# Patient Record
Sex: FEMALE | Race: WHITE | NOT HISPANIC OR LATINO | Employment: OTHER | URBAN - METROPOLITAN AREA
[De-identification: names, ages, dates, MRNs, and addresses within clinical notes are randomized per-mention and may not be internally consistent; named-entity substitution may affect disease eponyms.]

---

## 2023-04-20 ENCOUNTER — APPOINTMENT (OUTPATIENT)
Dept: LAB | Facility: LAB | Age: 68
End: 2023-04-20
Payer: MEDICARE

## 2023-04-20 ENCOUNTER — OFFICE VISIT (OUTPATIENT)
Dept: PRIMARY CARE | Facility: CLINIC | Age: 68
End: 2023-04-20
Payer: MEDICARE

## 2023-04-20 VITALS
HEART RATE: 68 BPM | DIASTOLIC BLOOD PRESSURE: 61 MMHG | OXYGEN SATURATION: 97 % | TEMPERATURE: 97.4 F | WEIGHT: 176.8 LBS | SYSTOLIC BLOOD PRESSURE: 135 MMHG | BODY MASS INDEX: 25.31 KG/M2 | HEIGHT: 70 IN

## 2023-04-20 DIAGNOSIS — E78.2 MIXED HYPERLIPIDEMIA: ICD-10-CM

## 2023-04-20 DIAGNOSIS — M15.9 OSTEOARTHRITIS OF MULTIPLE JOINTS, UNSPECIFIED OSTEOARTHRITIS TYPE: ICD-10-CM

## 2023-04-20 DIAGNOSIS — I10 BENIGN ESSENTIAL HYPERTENSION: ICD-10-CM

## 2023-04-20 DIAGNOSIS — D12.4 ADENOMATOUS POLYP OF DESCENDING COLON: ICD-10-CM

## 2023-04-20 DIAGNOSIS — R76.8 ANA POSITIVE: ICD-10-CM

## 2023-04-20 DIAGNOSIS — R31.1 BENIGN ESSENTIAL MICROSCOPIC HEMATURIA: Primary | ICD-10-CM

## 2023-04-20 DIAGNOSIS — K21.9 GASTROESOPHAGEAL REFLUX DISEASE WITHOUT ESOPHAGITIS: ICD-10-CM

## 2023-04-20 DIAGNOSIS — N30.01 ACUTE CYSTITIS WITH HEMATURIA: ICD-10-CM

## 2023-04-20 PROBLEM — D12.6 ADENOMATOUS POLYP OF COLON: Status: ACTIVE | Noted: 2023-04-20

## 2023-04-20 PROBLEM — E78.5 HYPERLIPIDEMIA: Status: ACTIVE | Noted: 2023-04-20

## 2023-04-20 PROBLEM — F41.1 GAD (GENERALIZED ANXIETY DISORDER): Status: ACTIVE | Noted: 2023-04-20

## 2023-04-20 PROBLEM — M19.90 OSTEOARTHRITIS: Status: ACTIVE | Noted: 2023-04-20

## 2023-04-20 PROBLEM — F41.1 GAD (GENERALIZED ANXIETY DISORDER): Status: RESOLVED | Noted: 2023-04-20 | Resolved: 2023-04-20

## 2023-04-20 LAB
BASOPHILS (10*3/UL) IN BLOOD BY AUTOMATED COUNT: 0.06 X10E9/L (ref 0–0.1)
BASOPHILS/100 LEUKOCYTES IN BLOOD BY AUTOMATED COUNT: 0.6 % (ref 0–2)
EOSINOPHILS (10*3/UL) IN BLOOD BY AUTOMATED COUNT: 0.16 X10E9/L (ref 0–0.7)
EOSINOPHILS/100 LEUKOCYTES IN BLOOD BY AUTOMATED COUNT: 1.5 % (ref 0–6)
ERYTHROCYTE DISTRIBUTION WIDTH (RATIO) BY AUTOMATED COUNT: 13.9 % (ref 11.5–14.5)
ERYTHROCYTE MEAN CORPUSCULAR HEMOGLOBIN CONCENTRATION (G/DL) BY AUTOMATED: 30.2 G/DL (ref 32–36)
ERYTHROCYTE MEAN CORPUSCULAR VOLUME (FL) BY AUTOMATED COUNT: 93 FL (ref 80–100)
ERYTHROCYTES (10*6/UL) IN BLOOD BY AUTOMATED COUNT: 4.75 X10E12/L (ref 4–5.2)
HEMATOCRIT (%) IN BLOOD BY AUTOMATED COUNT: 44.3 % (ref 36–46)
HEMOGLOBIN (G/DL) IN BLOOD: 13.4 G/DL (ref 12–16)
IMMATURE GRANULOCYTES/100 LEUKOCYTES IN BLOOD BY AUTOMATED COUNT: 0.3 % (ref 0–0.9)
LEUKOCYTES (10*3/UL) IN BLOOD BY AUTOMATED COUNT: 10.5 X10E9/L (ref 4.4–11.3)
LYMPHOCYTES (10*3/UL) IN BLOOD BY AUTOMATED COUNT: 2.33 X10E9/L (ref 1.2–4.8)
LYMPHOCYTES/100 LEUKOCYTES IN BLOOD BY AUTOMATED COUNT: 22.3 % (ref 13–44)
MONOCYTES (10*3/UL) IN BLOOD BY AUTOMATED COUNT: 0.85 X10E9/L (ref 0.1–1)
MONOCYTES/100 LEUKOCYTES IN BLOOD BY AUTOMATED COUNT: 8.1 % (ref 2–10)
NEUTROPHILS (10*3/UL) IN BLOOD BY AUTOMATED COUNT: 7.04 X10E9/L (ref 1.2–7.7)
NEUTROPHILS/100 LEUKOCYTES IN BLOOD BY AUTOMATED COUNT: 67.2 % (ref 40–80)
NRBC (PER 100 WBCS) BY AUTOMATED COUNT: 0 /100 WBC (ref 0–0)
PLATELETS (10*3/UL) IN BLOOD AUTOMATED COUNT: 289 X10E9/L (ref 150–450)

## 2023-04-20 PROCEDURE — 1157F ADVNC CARE PLAN IN RCRD: CPT | Performed by: INTERNAL MEDICINE

## 2023-04-20 PROCEDURE — 87077 CULTURE AEROBIC IDENTIFY: CPT

## 2023-04-20 PROCEDURE — 1159F MED LIST DOCD IN RCRD: CPT | Performed by: INTERNAL MEDICINE

## 2023-04-20 PROCEDURE — 99214 OFFICE O/P EST MOD 30 MIN: CPT | Performed by: INTERNAL MEDICINE

## 2023-04-20 PROCEDURE — 81001 URINALYSIS AUTO W/SCOPE: CPT

## 2023-04-20 PROCEDURE — 87186 SC STD MICRODIL/AGAR DIL: CPT

## 2023-04-20 PROCEDURE — 1036F TOBACCO NON-USER: CPT | Performed by: INTERNAL MEDICINE

## 2023-04-20 PROCEDURE — 85025 COMPLETE CBC W/AUTO DIFF WBC: CPT

## 2023-04-20 PROCEDURE — 1160F RVW MEDS BY RX/DR IN RCRD: CPT | Performed by: INTERNAL MEDICINE

## 2023-04-20 PROCEDURE — 3078F DIAST BP <80 MM HG: CPT | Performed by: INTERNAL MEDICINE

## 2023-04-20 PROCEDURE — 87086 URINE CULTURE/COLONY COUNT: CPT

## 2023-04-20 PROCEDURE — 36415 COLL VENOUS BLD VENIPUNCTURE: CPT

## 2023-04-20 PROCEDURE — 3075F SYST BP GE 130 - 139MM HG: CPT | Performed by: INTERNAL MEDICINE

## 2023-04-20 PROCEDURE — 80053 COMPREHEN METABOLIC PANEL: CPT

## 2023-04-20 RX ORDER — CIPROFLOXACIN 500 MG/1
500 TABLET ORAL 2 TIMES DAILY
Qty: 20 TABLET | Refills: 0 | Status: SHIPPED | OUTPATIENT
Start: 2023-04-20 | End: 2023-04-30

## 2023-04-20 RX ORDER — HYDROXYCHLOROQUINE SULFATE 200 MG/1
2 TABLET, FILM COATED ORAL DAILY
COMMUNITY
Start: 2023-01-11

## 2023-04-20 RX ORDER — PREDNISONE 5 MG/1
1 TABLET ORAL DAILY
COMMUNITY
Start: 2023-04-05

## 2023-04-20 RX ORDER — TAMSULOSIN HYDROCHLORIDE 0.4 MG/1
0.4 CAPSULE ORAL DAILY
Qty: 30 CAPSULE | Refills: 11 | Status: SHIPPED | OUTPATIENT
Start: 2023-04-20 | End: 2024-03-12 | Stop reason: ALTCHOICE

## 2023-04-20 NOTE — ASSESSMENT & PLAN NOTE
CBC BMP urinalysis ultrasound of kidney and bladder given Cipro probiotics Flomax urology evaluation if problems continue Dr. Gunter

## 2023-04-20 NOTE — PROGRESS NOTES
Patient ID: Jessika Gama is a 67 y.o. female who presents for UTI (Hard time going to the bathroom, frequency, blood ).    Assessment/Plan     Problem List Items Addressed This Visit          Circulatory    RESOLVED: Benign essential hypertension       Digestive    Adenomatous polyp of colon    GERD (gastroesophageal reflux disease)     Tums Mylanta Prilosec GI evaluation for EGD colonoscopy            Genitourinary    Benign essential microscopic hematuria - Primary    Relevant Medications    tamsulosin (Flomax) 0.4 mg 24 hr capsule    Other Relevant Orders    Comprehensive Metabolic Panel    Acute cystitis with hematuria     CBC BMP urinalysis ultrasound of kidney and bladder given Cipro probiotics Flomax urology evaluation if problems continue Dr. Gunter         Relevant Medications    ciprofloxacin (Cipro) 500 mg tablet    tamsulosin (Flomax) 0.4 mg 24 hr capsule    Other Relevant Orders    CBC and Auto Differential    Urinalysis Microscopic Only    Urine Culture       Musculoskeletal    Osteoarthritis       Hematologic    FLAVIA positive     Rheumatology ophthalmology evaluation continue Plaquenil folic acid prednisone            Other    Hyperlipidemia     Endocrine work-up check thyroid lipid sugar follow-up            Source of history: Nurse, Medical personnel, Medical record, Patient.  History limitation: None.      HPI  Complaining of joint pain stomach pain pelvic pain urgency frequency hematuria onset acutely duration 2 weeks progressed acutely aggravating factor autoimmune disease underlying cystitis or nephritis cannot be rule out advised ultrasound of the gallbladder liver pancreas and kidney refer patient to urologist and rheumatologist    We will try the Cipro hydration and follow-up  No Known Allergies    Medications    Current Outpatient Medications   Medication Sig Dispense Refill    hydroxychloroquine (Plaquenil) 200 mg tablet Take 2 tablets (400 mg) by mouth once daily.      predniSONE  "(Deltasone) 5 mg tablet Take 1 tablet (5 mg) by mouth in the morning and 1 tablet (5 mg) before bedtime.      ciprofloxacin (Cipro) 500 mg tablet Take 1 tablet (500 mg) by mouth in the morning and 1 tablet (500 mg) before bedtime. Do all this for 10 days. 20 tablet 0    tamsulosin (Flomax) 0.4 mg 24 hr capsule Take 1 capsule (0.4 mg) by mouth once daily. 30 capsule 11     No current facility-administered medications for this visit.       Objective   Visit Vitals  /61 (BP Location: Right arm, Patient Position: Sitting, BP Cuff Size: Large adult)   Pulse 68   Temp 36.3 °C (97.4 °F)   Ht 1.778 m (5' 10\")   Wt 80.2 kg (176 lb 12.8 oz)   SpO2 97%   BMI 25.37 kg/m²   Smoking Status Never   BSA 1.99 m²       PHYSICAL EXAM  General: NAD. NCAT. Aox3   HEENT: PERRLA. EOMI. MMM. Nares patent bl.  Cardiovascular: RRR. No MRG. S1/S2 wnl.   Respiratory: CTABL. No acute respiratory distress.   GI: Soft, NT abdomen. BS present x 4.   : Pelvic pain kidney pain tenderness  MSK: Multiple muscles and joint group tenderness extremities: No edema. Cap refill < 2 sec.   Skin: No rashes or bruises.   Neuro: Aox3. Cranial Nerves grossly intact. Motor/sensory wnl.   Psych: Mood wnl.          ROS  Constitutional: Denies fevers, chills, fatigue, weight loss/gain  HEENT: Denies HA, vision changes, hearing loss, sore throat  Cardiac: Denies CP, palpitations, edema  Respiratory: Denies SOB, cough, pleuritic chest pain, PND, orthopnea  GI: Denies N/V/D, abd pain, constipation, black/bloody stools  : Denies urinary changes, frequency, hematuria, urgency, retention, flank pain  MSK: Denies joint pain, joint swelling, back pain, neck pain, extremity pain  Neuro: Denies numbness, weakness, tingling    Immunization History   Administered Date(s) Administered    Pfizer Purple Cap SARS-CoV-2 07/30/2021, 08/20/2021       No visits with results within 4 Month(s) from this visit.   Latest known visit with results is:   Legacy Encounter on " 01/05/2021   Component Date Value Ref Range Status    CRP 01/05/2021 0.29  mg/dL Final    Path Review - Serum Protein Electr* 01/05/2021 L.[id number]   Final    Sedimentation Rate 01/05/2021 2  0 - 30 mm/h Final    Total Protein 01/05/2021 6.1 (L)  6.4 - 8.2 g/dL Final    Albumin 01/05/2021 3.8  3.4 - 5.0 g/dL Final    Alpha 1 01/05/2021 0.3  0.2 - 0.6 g/dL Final    Alpha 2 01/05/2021 0.7  0.4 - 1.1 g/dL Final    Beta 01/05/2021 0.6  0.5 - 1.2 g/dL Final    Gamma 01/05/2021 0.6  0.5 - 1.4 g/dL Final    SPE Interpretation 01/05/2021 NORMAL   Final       Radiology: Reviewed imaging in powerchart.  No results found.    Family History   Problem Relation Name Age of Onset    Other (smoker) Mother      Emphysema Mother      Colon cancer Father       Social History     Socioeconomic History    Marital status:      Spouse name: None    Number of children: None    Years of education: None    Highest education level: None   Occupational History    None   Tobacco Use    Smoking status: Never    Smokeless tobacco: Never   Vaping Use    Vaping status: None   Substance and Sexual Activity    Alcohol use: Never    Drug use: Never    Sexual activity: None   Other Topics Concern    None   Social History Narrative    None     Social Determinants of Health     Financial Resource Strain: Not on file   Food Insecurity: Not on file   Transportation Needs: Not on file   Physical Activity: Not on file   Stress: Not on file   Social Connections: Not on file   Intimate Partner Violence: Not on file   Housing Stability: Not on file     Past Medical History:   Diagnosis Date    Abnormal findings on diagnostic imaging of other specified body structures 06/21/2021    Abnormal CT scan, chest    Acute maxillary sinusitis, unspecified 05/28/2021    Acute maxillary sinusitis    Allergic rhinitis, unspecified 06/21/2021    Chronic allergic rhinitis    Cleft lip, bilateral 01/21/2018    Bilateral cleft lip, complete    Encounter for follow-up  examination after completed treatment for conditions other than malignant neoplasm 01/11/2017    Follow-up for plastic surgery    Encounter for immunization 10/07/2021    Encounter for immunization    Encounter for screening for malignant neoplasm of vagina     Vaginal Pap smear    Encounter for screening mammogram for malignant neoplasm of breast 07/06/2021    Other screening mammogram    Generalized anxiety disorder 10/08/2021    JOVANA (generalized anxiety disorder)    Headache, unspecified 06/08/2021    Severe frontal headaches    Insomnia, unspecified 09/12/2019    Insomnia, persistent    Migraine, unspecified, not intractable, without status migrainosus 06/21/2021    Migraine headache    Otalgia, right ear 04/15/2022    Right ear pain    Other bursitis of hip, left hip 01/21/2018    Bursitis of left hip    Other malaise 06/08/2021    Malaise and fatigue    Other skin changes 12/14/2016    Facial aging    Pain in right shoulder 06/08/2021    Right shoulder pain    Pain in unspecified hand 01/21/2018    Pain in hand and fingers    Personal history of diseases of the skin and subcutaneous tissue 01/04/2019    History of cellulitis    Personal history of other diseases of the musculoskeletal system and connective tissue 06/21/2021    History of fibromyalgia    Personal history of other diseases of the respiratory system 03/30/2017    History of acute sinusitis    Personal history of other diseases of the respiratory system 03/30/2017    History of pharyngitis    Personal history of other diseases of the respiratory system 03/22/2017    History of sinusitis    Personal history of other drug therapy 06/25/2021    History of postmenopausal hormone replacement therapy    Personal history of other endocrine, nutritional and metabolic disease 06/21/2021    History of vitamin D deficiency    Personal history of other mental and behavioral disorders 01/04/2019    History of anxiety disorder    Personal history of other  mental and behavioral disorders 01/11/2017    History of anxiety disorder    Personal history of other specified conditions 06/21/2021    History of fatigue    Personal history of other specified conditions 10/07/2021    History of edema    Personal history of pneumonia (recurrent) 06/21/2021    History of pneumonia    Recurrent oral aphthae 03/22/2017    Canker sore    Unspecified mycosis 06/25/2021    Fungal infection of lung    Unspecified nonsuppurative otitis media, unspecified ear 03/17/2015    Middle ear effusion    Unspecified osteoarthritis, unspecified site 10/08/2021    Osteoarthritis     Past Surgical History:   Procedure Laterality Date    CATARACT EXTRACTION  05/11/2016    Cataract Surgery    COLONOSCOPY  03/04/2014    Complete Colonoscopy    HIP SURGERY  03/04/2014    Hip Surgery Right    OOPHORECTOMY  05/11/2016    Oophorectomy    OTHER SURGICAL HISTORY  03/04/2014    Hip Surgery Left    OTHER SURGICAL HISTORY  05/11/2016    Palatoplasty For Cleft Palate    OTHER SURGICAL HISTORY  05/11/2016    Repair Of Brow Ptosis Left Upper Eyelid    RHINOPLASTY  05/11/2016    Rhinoplasty     * Cannot find OR log *    Charting was completed using voice recognition technology and may include unintended errors.

## 2023-04-21 ENCOUNTER — TELEPHONE (OUTPATIENT)
Dept: PRIMARY CARE | Facility: CLINIC | Age: 68
End: 2023-04-21
Payer: MEDICARE

## 2023-04-21 DIAGNOSIS — N39.0 URINARY TRACT INFECTION WITHOUT HEMATURIA, SITE UNSPECIFIED: ICD-10-CM

## 2023-04-21 LAB
ALANINE AMINOTRANSFERASE (SGPT) (U/L) IN SER/PLAS: 13 U/L (ref 7–45)
ALBUMIN (G/DL) IN SER/PLAS: 4.4 G/DL (ref 3.4–5)
ALKALINE PHOSPHATASE (U/L) IN SER/PLAS: 46 U/L (ref 33–136)
ANION GAP IN SER/PLAS: 14 MMOL/L (ref 10–20)
ASPARTATE AMINOTRANSFERASE (SGOT) (U/L) IN SER/PLAS: 15 U/L (ref 9–39)
BACTERIA, URINE: ABNORMAL /HPF
BILIRUBIN TOTAL (MG/DL) IN SER/PLAS: 0.5 MG/DL (ref 0–1.2)
CALCIUM (MG/DL) IN SER/PLAS: 9.8 MG/DL (ref 8.6–10.6)
CARBON DIOXIDE, TOTAL (MMOL/L) IN SER/PLAS: 29 MMOL/L (ref 21–32)
CHLORIDE (MMOL/L) IN SER/PLAS: 102 MMOL/L (ref 98–107)
CREATININE (MG/DL) IN SER/PLAS: 0.64 MG/DL (ref 0.5–1.05)
GFR FEMALE: >90 ML/MIN/1.73M2
GLUCOSE (MG/DL) IN SER/PLAS: 108 MG/DL (ref 74–99)
POTASSIUM (MMOL/L) IN SER/PLAS: 4 MMOL/L (ref 3.5–5.3)
PROTEIN TOTAL: 6.5 G/DL (ref 6.4–8.2)
RBC, URINE: <1 /HPF (ref 0–5)
SODIUM (MMOL/L) IN SER/PLAS: 141 MMOL/L (ref 136–145)
UREA NITROGEN (MG/DL) IN SER/PLAS: 12 MG/DL (ref 6–23)
WBC CLUMPS, URINE: ABNORMAL /HPF
WBC, URINE: 68 /HPF (ref 0–5)

## 2023-04-21 NOTE — TELEPHONE ENCOUNTER
PT IS AWARE TO FOLLOW A LOW CARB DIET, THAT SHE HAS ANEMIA AND A UTI.  PLEASE SEND THE KEFLEX TO [facility] AND PT IS AWARE TO TAKE PROBIOTICS AND TO COME IN TO REPEAT A UA IN 2 WEEKS

## 2023-04-21 NOTE — TELEPHONE ENCOUNTER
----- Message from Elaine Martinez MD sent at 4/21/2023  9:10 AM EDT -----  Hyperglycemia anemia UTI advised low-carb diet Keflex 500 twice a day for 1 week repeat UA CNS 2 weeks take probiotics

## 2023-04-23 LAB — URINE CULTURE: ABNORMAL

## 2023-04-24 ENCOUNTER — TELEPHONE (OUTPATIENT)
Dept: PRIMARY CARE | Facility: CLINIC | Age: 68
End: 2023-04-24
Payer: MEDICARE

## 2023-04-24 NOTE — TELEPHONE ENCOUNTER
----- Message from Elaine Martinez MD sent at 4/24/2023 10:09 AM EDT -----  UTI discontinue nitrofurantoin just take Keflex 500 twice a day for 1 week

## 2023-04-25 ENCOUNTER — TELEPHONE (OUTPATIENT)
Dept: PRIMARY CARE | Facility: CLINIC | Age: 68
End: 2023-04-25
Payer: MEDICARE

## 2023-04-25 NOTE — TELEPHONE ENCOUNTER
----- Message from Elaine Martinez MD sent at 4/24/2023 10:27 AM EDT -----  See OB/GYN Dr. Cheri Harris

## 2023-08-30 ENCOUNTER — OFFICE (OUTPATIENT)
Dept: URBAN - METROPOLITAN AREA CLINIC 27 | Facility: CLINIC | Age: 68
End: 2023-08-30
Payer: COMMERCIAL

## 2023-08-30 VITALS
HEIGHT: 70 IN | SYSTOLIC BLOOD PRESSURE: 158 MMHG | DIASTOLIC BLOOD PRESSURE: 84 MMHG | WEIGHT: 166 LBS | HEART RATE: 56 BPM | TEMPERATURE: 97.4 F

## 2023-08-30 DIAGNOSIS — R19.7 DIARRHEA, UNSPECIFIED: ICD-10-CM

## 2023-08-30 DIAGNOSIS — K21.9 GASTRO-ESOPHAGEAL REFLUX DISEASE WITHOUT ESOPHAGITIS: ICD-10-CM

## 2023-08-30 LAB — THYROTROPIN (MIU/L) IN SER/PLAS BY DETECTION LIMIT <= 0.05 MIU/L: 4.61 MIU/L (ref 0.44–3.98)

## 2023-08-30 PROCEDURE — 99204 OFFICE O/P NEW MOD 45 MIN: CPT | Performed by: INTERNAL MEDICINE

## 2023-08-31 ENCOUNTER — AMBULATORY SURGICAL CENTER (OUTPATIENT)
Dept: URBAN - METROPOLITAN AREA SURGERY 12 | Facility: SURGERY | Age: 68
End: 2023-08-31
Payer: COMMERCIAL

## 2023-08-31 ENCOUNTER — AMBULATORY SURGICAL CENTER (OUTPATIENT)
Dept: URBAN - METROPOLITAN AREA SURGERY 12 | Facility: SURGERY | Age: 68
End: 2023-08-31

## 2023-08-31 ENCOUNTER — OFFICE (OUTPATIENT)
Dept: URBAN - METROPOLITAN AREA PATHOLOGY 2 | Facility: PATHOLOGY | Age: 68
End: 2023-08-31
Payer: COMMERCIAL

## 2023-08-31 VITALS
DIASTOLIC BLOOD PRESSURE: 77 MMHG | OXYGEN SATURATION: 96 % | SYSTOLIC BLOOD PRESSURE: 114 MMHG | OXYGEN SATURATION: 100 % | SYSTOLIC BLOOD PRESSURE: 105 MMHG | RESPIRATION RATE: 11 BRPM | WEIGHT: 162 LBS | HEART RATE: 58 BPM | HEIGHT: 70 IN | RESPIRATION RATE: 13 BRPM | HEART RATE: 59 BPM | DIASTOLIC BLOOD PRESSURE: 74 MMHG | RESPIRATION RATE: 11 BRPM | RESPIRATION RATE: 12 BRPM | DIASTOLIC BLOOD PRESSURE: 61 MMHG | HEART RATE: 60 BPM | SYSTOLIC BLOOD PRESSURE: 98 MMHG | DIASTOLIC BLOOD PRESSURE: 64 MMHG | OXYGEN SATURATION: 100 % | SYSTOLIC BLOOD PRESSURE: 98 MMHG | OXYGEN SATURATION: 88 % | RESPIRATION RATE: 14 BRPM | HEART RATE: 61 BPM | DIASTOLIC BLOOD PRESSURE: 69 MMHG | DIASTOLIC BLOOD PRESSURE: 61 MMHG | SYSTOLIC BLOOD PRESSURE: 105 MMHG | DIASTOLIC BLOOD PRESSURE: 61 MMHG | OXYGEN SATURATION: 88 % | DIASTOLIC BLOOD PRESSURE: 69 MMHG | HEART RATE: 70 BPM | SYSTOLIC BLOOD PRESSURE: 140 MMHG | OXYGEN SATURATION: 100 % | RESPIRATION RATE: 17 BRPM | SYSTOLIC BLOOD PRESSURE: 114 MMHG | SYSTOLIC BLOOD PRESSURE: 111 MMHG | SYSTOLIC BLOOD PRESSURE: 140 MMHG | DIASTOLIC BLOOD PRESSURE: 74 MMHG | HEART RATE: 70 BPM | DIASTOLIC BLOOD PRESSURE: 64 MMHG | OXYGEN SATURATION: 98 % | WEIGHT: 162 LBS | DIASTOLIC BLOOD PRESSURE: 55 MMHG | DIASTOLIC BLOOD PRESSURE: 55 MMHG | TEMPERATURE: 98.3 F | WEIGHT: 162 LBS | SYSTOLIC BLOOD PRESSURE: 111 MMHG | DIASTOLIC BLOOD PRESSURE: 69 MMHG | SYSTOLIC BLOOD PRESSURE: 139 MMHG | SYSTOLIC BLOOD PRESSURE: 111 MMHG | RESPIRATION RATE: 17 BRPM | SYSTOLIC BLOOD PRESSURE: 105 MMHG | HEART RATE: 63 BPM | SYSTOLIC BLOOD PRESSURE: 128 MMHG | RESPIRATION RATE: 14 BRPM | RESPIRATION RATE: 18 BRPM | RESPIRATION RATE: 13 BRPM | HEIGHT: 70 IN | RESPIRATION RATE: 12 BRPM | DIASTOLIC BLOOD PRESSURE: 55 MMHG | HEART RATE: 62 BPM | TEMPERATURE: 98.3 F | HEART RATE: 63 BPM | SYSTOLIC BLOOD PRESSURE: 100 MMHG | OXYGEN SATURATION: 96 % | DIASTOLIC BLOOD PRESSURE: 78 MMHG | DIASTOLIC BLOOD PRESSURE: 64 MMHG | OXYGEN SATURATION: 98 % | SYSTOLIC BLOOD PRESSURE: 114 MMHG | RESPIRATION RATE: 18 BRPM | RESPIRATION RATE: 12 BRPM | RESPIRATION RATE: 11 BRPM | RESPIRATION RATE: 14 BRPM | DIASTOLIC BLOOD PRESSURE: 74 MMHG | OXYGEN SATURATION: 96 % | SYSTOLIC BLOOD PRESSURE: 140 MMHG | SYSTOLIC BLOOD PRESSURE: 139 MMHG | OXYGEN SATURATION: 88 % | TEMPERATURE: 98.3 F | HEART RATE: 63 BPM | HEART RATE: 58 BPM | HEART RATE: 59 BPM | DIASTOLIC BLOOD PRESSURE: 77 MMHG | HEART RATE: 62 BPM | DIASTOLIC BLOOD PRESSURE: 77 MMHG | SYSTOLIC BLOOD PRESSURE: 98 MMHG | RESPIRATION RATE: 13 BRPM | HEART RATE: 70 BPM | HEART RATE: 60 BPM | HEART RATE: 60 BPM | SYSTOLIC BLOOD PRESSURE: 100 MMHG | HEART RATE: 58 BPM | RESPIRATION RATE: 18 BRPM | DIASTOLIC BLOOD PRESSURE: 78 MMHG | SYSTOLIC BLOOD PRESSURE: 128 MMHG | DIASTOLIC BLOOD PRESSURE: 78 MMHG | HEART RATE: 62 BPM | HEART RATE: 61 BPM | OXYGEN SATURATION: 98 % | SYSTOLIC BLOOD PRESSURE: 139 MMHG | HEART RATE: 61 BPM | SYSTOLIC BLOOD PRESSURE: 128 MMHG | HEART RATE: 59 BPM | RESPIRATION RATE: 17 BRPM | SYSTOLIC BLOOD PRESSURE: 100 MMHG | HEIGHT: 70 IN

## 2023-08-31 DIAGNOSIS — K44.9 DIAPHRAGMATIC HERNIA WITHOUT OBSTRUCTION OR GANGRENE: ICD-10-CM

## 2023-08-31 DIAGNOSIS — K21.00 GASTRO-ESOPHAGEAL REFLUX DISEASE WITH ESOPHAGITIS, WITHOUT B: ICD-10-CM

## 2023-08-31 DIAGNOSIS — K21.9 GASTRO-ESOPHAGEAL REFLUX DISEASE WITHOUT ESOPHAGITIS: ICD-10-CM

## 2023-08-31 PROBLEM — K22.89 OTHER SPECIFIED DISEASE OF ESOPHAGUS: Status: ACTIVE | Noted: 2023-08-31

## 2023-08-31 PROCEDURE — 43450 DILATE ESOPHAGUS 1/MULT PASS: CPT | Performed by: INTERNAL MEDICINE

## 2023-08-31 PROCEDURE — 43239 EGD BIOPSY SINGLE/MULTIPLE: CPT | Performed by: INTERNAL MEDICINE

## 2023-08-31 PROCEDURE — 88342 IMHCHEM/IMCYTCHM 1ST ANTB: CPT | Performed by: PATHOLOGY

## 2023-08-31 PROCEDURE — 88313 SPECIAL STAINS GROUP 2: CPT | Performed by: PATHOLOGY

## 2023-08-31 PROCEDURE — 88305 TISSUE EXAM BY PATHOLOGIST: CPT | Performed by: PATHOLOGY

## 2023-09-07 ENCOUNTER — TELEPHONE (OUTPATIENT)
Dept: PRIMARY CARE | Facility: CLINIC | Age: 68
End: 2023-09-07
Payer: MEDICARE

## 2023-09-07 NOTE — TELEPHONE ENCOUNTER
Pt requesting referral for eye doctor due to heavy eyelids, fax number [phone] Dr. Mackey. Please advise.

## 2023-12-11 ENCOUNTER — LAB (OUTPATIENT)
Dept: LAB | Facility: LAB | Age: 68
End: 2023-12-11
Payer: MEDICARE

## 2023-12-11 ENCOUNTER — OFFICE (OUTPATIENT)
Dept: URBAN - METROPOLITAN AREA CLINIC 27 | Facility: CLINIC | Age: 68
End: 2023-12-11
Payer: COMMERCIAL

## 2023-12-11 VITALS — WEIGHT: 175 LBS | HEIGHT: 70 IN

## 2023-12-11 DIAGNOSIS — R11.0 NAUSEA: ICD-10-CM

## 2023-12-11 DIAGNOSIS — R19.7 DIARRHEA, UNSPECIFIED: ICD-10-CM

## 2023-12-11 DIAGNOSIS — R11.0 NAUSEA: Primary | ICD-10-CM

## 2023-12-11 PROCEDURE — 99214 OFFICE O/P EST MOD 30 MIN: CPT | Performed by: INTERNAL MEDICINE

## 2023-12-11 PROCEDURE — 82150 ASSAY OF AMYLASE: CPT

## 2023-12-11 PROCEDURE — 80053 COMPREHEN METABOLIC PANEL: CPT

## 2023-12-11 PROCEDURE — 85652 RBC SED RATE AUTOMATED: CPT

## 2023-12-11 PROCEDURE — 85025 COMPLETE CBC W/AUTO DIFF WBC: CPT

## 2023-12-11 PROCEDURE — 86364 TISS TRNSGLTMNASE EA IG CLAS: CPT

## 2023-12-11 PROCEDURE — 86258 DGP ANTIBODY EACH IG CLASS: CPT

## 2023-12-12 LAB
ALBUMIN SERPL BCP-MCNC: 4.6 G/DL (ref 3.4–5)
ALP SERPL-CCNC: 53 U/L (ref 33–136)
ALT SERPL W P-5'-P-CCNC: 17 U/L (ref 7–45)
AMYLASE SERPL-CCNC: 42 U/L (ref 29–103)
ANION GAP SERPL CALC-SCNC: 15 MMOL/L (ref 10–20)
AST SERPL W P-5'-P-CCNC: 15 U/L (ref 9–39)
BASOPHILS # BLD AUTO: 0.01 X10*3/UL (ref 0–0.1)
BASOPHILS NFR BLD AUTO: 0.1 %
BILIRUB SERPL-MCNC: 0.4 MG/DL (ref 0–1.2)
BUN SERPL-MCNC: 19 MG/DL (ref 6–23)
CALCIUM SERPL-MCNC: 9.1 MG/DL (ref 8.6–10.6)
CHLORIDE SERPL-SCNC: 101 MMOL/L (ref 98–107)
CO2 SERPL-SCNC: 26 MMOL/L (ref 21–32)
CREAT SERPL-MCNC: 0.65 MG/DL (ref 0.5–1.05)
EOSINOPHIL # BLD AUTO: 0 X10*3/UL (ref 0–0.7)
EOSINOPHIL NFR BLD AUTO: 0 %
ERYTHROCYTE [DISTWIDTH] IN BLOOD BY AUTOMATED COUNT: 12.7 % (ref 11.5–14.5)
ERYTHROCYTE [SEDIMENTATION RATE] IN BLOOD BY WESTERGREN METHOD: 2 MM/H (ref 0–30)
GFR SERPL CREATININE-BSD FRML MDRD: >90 ML/MIN/1.73M*2
GLIADIN PEPTIDE IGA SER IA-ACNC: <1 U/ML
GLIADIN PEPTIDE IGG SER IA-ACNC: <1 U/ML
GLUCOSE SERPL-MCNC: 135 MG/DL (ref 74–99)
HCT VFR BLD AUTO: 41.7 % (ref 36–46)
HGB BLD-MCNC: 13.1 G/DL (ref 12–16)
IMM GRANULOCYTES # BLD AUTO: 0.02 X10*3/UL (ref 0–0.7)
IMM GRANULOCYTES NFR BLD AUTO: 0.2 % (ref 0–0.9)
LYMPHOCYTES # BLD AUTO: 1.06 X10*3/UL (ref 1.2–4.8)
LYMPHOCYTES NFR BLD AUTO: 12.9 %
MCH RBC QN AUTO: 27.6 PG (ref 26–34)
MCHC RBC AUTO-ENTMCNC: 31.4 G/DL (ref 32–36)
MCV RBC AUTO: 88 FL (ref 80–100)
MONOCYTES # BLD AUTO: 0.74 X10*3/UL (ref 0.1–1)
MONOCYTES NFR BLD AUTO: 9 %
NEUTROPHILS # BLD AUTO: 6.37 X10*3/UL (ref 1.2–7.7)
NEUTROPHILS NFR BLD AUTO: 77.8 %
NRBC BLD-RTO: 0 /100 WBCS (ref 0–0)
PLATELET # BLD AUTO: 296 X10*3/UL (ref 150–450)
POTASSIUM SERPL-SCNC: 4.1 MMOL/L (ref 3.5–5.3)
PROT SERPL-MCNC: 6.8 G/DL (ref 6.4–8.2)
RBC # BLD AUTO: 4.74 X10*6/UL (ref 4–5.2)
SODIUM SERPL-SCNC: 138 MMOL/L (ref 136–145)
TTG IGA SER IA-ACNC: <1 U/ML
TTG IGG SER IA-ACNC: <1 U/ML
WBC # BLD AUTO: 8.2 X10*3/UL (ref 4.4–11.3)

## 2024-02-26 ENCOUNTER — OFFICE (OUTPATIENT)
Dept: URBAN - METROPOLITAN AREA CLINIC 26 | Facility: CLINIC | Age: 69
End: 2024-02-26

## 2024-02-26 VITALS
HEART RATE: 68 BPM | SYSTOLIC BLOOD PRESSURE: 136 MMHG | WEIGHT: 176 LBS | DIASTOLIC BLOOD PRESSURE: 78 MMHG | TEMPERATURE: 97.2 F | HEIGHT: 70 IN

## 2024-02-26 DIAGNOSIS — K52.832 LYMPHOCYTIC COLITIS: ICD-10-CM

## 2024-02-26 DIAGNOSIS — Z80.0 FAMILY HISTORY OF MALIGNANT NEOPLASM OF DIGESTIVE ORGANS: ICD-10-CM

## 2024-02-26 DIAGNOSIS — R19.7 DIARRHEA, UNSPECIFIED: ICD-10-CM

## 2024-02-26 DIAGNOSIS — D36.9 BENIGN NEOPLASM, UNSPECIFIED SITE: ICD-10-CM

## 2024-02-26 DIAGNOSIS — K21.9 GASTRO-ESOPHAGEAL REFLUX DISEASE WITHOUT ESOPHAGITIS: ICD-10-CM

## 2024-02-26 PROCEDURE — 99214 OFFICE O/P EST MOD 30 MIN: CPT | Performed by: NURSE PRACTITIONER

## 2024-02-26 RX ORDER — COLESEVELAM HYDROCHLORIDE 3.75 G/1
3.75 POWDER, FOR SUSPENSION ORAL
Qty: 30 | Refills: 0 | Status: ACTIVE
Start: 2024-02-26

## 2024-03-12 ENCOUNTER — OFFICE VISIT (OUTPATIENT)
Dept: PRIMARY CARE | Facility: CLINIC | Age: 69
End: 2024-03-12
Payer: MEDICARE

## 2024-03-12 ENCOUNTER — LAB (OUTPATIENT)
Dept: LAB | Facility: LAB | Age: 69
End: 2024-03-12
Payer: MEDICARE

## 2024-03-12 VITALS
SYSTOLIC BLOOD PRESSURE: 140 MMHG | TEMPERATURE: 96.8 F | OXYGEN SATURATION: 98 % | WEIGHT: 177.6 LBS | HEART RATE: 57 BPM | DIASTOLIC BLOOD PRESSURE: 84 MMHG | HEIGHT: 70 IN | BODY MASS INDEX: 25.43 KG/M2

## 2024-03-12 DIAGNOSIS — K52.9 CHRONIC DIARRHEA OF UNKNOWN ORIGIN: ICD-10-CM

## 2024-03-12 DIAGNOSIS — R79.89 ABNORMAL THYROID SCREEN (BLOOD): ICD-10-CM

## 2024-03-12 DIAGNOSIS — Z23 NEED FOR PNEUMOCOCCAL VACCINE: ICD-10-CM

## 2024-03-12 DIAGNOSIS — Z78.0 ENCOUNTER FOR OSTEOPOROSIS SCREENING IN ASYMPTOMATIC POSTMENOPAUSAL PATIENT: ICD-10-CM

## 2024-03-12 DIAGNOSIS — Z11.59 ENCOUNTER FOR HEPATITIS C SCREENING TEST FOR LOW RISK PATIENT: ICD-10-CM

## 2024-03-12 DIAGNOSIS — M35.3 POLYMYALGIA RHEUMATICA (MULTI): ICD-10-CM

## 2024-03-12 DIAGNOSIS — E04.9 GOITER: ICD-10-CM

## 2024-03-12 DIAGNOSIS — Z00.00 MEDICARE ANNUAL WELLNESS VISIT, SUBSEQUENT: Primary | ICD-10-CM

## 2024-03-12 DIAGNOSIS — R76.8 ANA POSITIVE: ICD-10-CM

## 2024-03-12 DIAGNOSIS — Z13.820 ENCOUNTER FOR OSTEOPOROSIS SCREENING IN ASYMPTOMATIC POSTMENOPAUSAL PATIENT: ICD-10-CM

## 2024-03-12 DIAGNOSIS — I10 BENIGN ESSENTIAL HYPERTENSION: ICD-10-CM

## 2024-03-12 PROBLEM — D12.6 ADENOMATOUS POLYP OF COLON: Status: RESOLVED | Noted: 2023-04-20 | Resolved: 2024-03-12

## 2024-03-12 PROBLEM — R31.1 BENIGN ESSENTIAL MICROSCOPIC HEMATURIA: Status: RESOLVED | Noted: 2023-04-20 | Resolved: 2024-03-12

## 2024-03-12 PROBLEM — N30.01 ACUTE CYSTITIS WITH HEMATURIA: Status: RESOLVED | Noted: 2023-04-20 | Resolved: 2024-03-12

## 2024-03-12 PROBLEM — M19.90 OSTEOARTHRITIS: Status: RESOLVED | Noted: 2023-04-20 | Resolved: 2024-03-12

## 2024-03-12 PROBLEM — K21.9 GERD (GASTROESOPHAGEAL REFLUX DISEASE): Status: RESOLVED | Noted: 2023-04-20 | Resolved: 2024-03-12

## 2024-03-12 PROBLEM — E78.5 HYPERLIPIDEMIA: Status: RESOLVED | Noted: 2023-04-20 | Resolved: 2024-03-12

## 2024-03-12 LAB
EST. AVERAGE GLUCOSE BLD GHB EST-MCNC: 100 MG/DL
HBA1C MFR BLD: 5.1 %
HCV AB SER QL: NONREACTIVE
T3FREE SERPL-MCNC: 3.1 PG/ML (ref 2.3–4.2)
THYROPEROXIDASE AB SERPL-ACNC: 39 IU/ML
TSH SERPL-ACNC: 3.47 MIU/L (ref 0.44–3.98)

## 2024-03-12 PROCEDURE — 1125F AMNT PAIN NOTED PAIN PRSNT: CPT | Performed by: INTERNAL MEDICINE

## 2024-03-12 PROCEDURE — 84482 T3 REVERSE: CPT

## 2024-03-12 PROCEDURE — 90677 PCV20 VACCINE IM: CPT | Performed by: INTERNAL MEDICINE

## 2024-03-12 PROCEDURE — 1170F FXNL STATUS ASSESSED: CPT | Performed by: INTERNAL MEDICINE

## 2024-03-12 PROCEDURE — 1036F TOBACCO NON-USER: CPT | Performed by: INTERNAL MEDICINE

## 2024-03-12 PROCEDURE — G0444 DEPRESSION SCREEN ANNUAL: HCPCS | Performed by: INTERNAL MEDICINE

## 2024-03-12 PROCEDURE — 3079F DIAST BP 80-89 MM HG: CPT | Performed by: INTERNAL MEDICINE

## 2024-03-12 PROCEDURE — 1157F ADVNC CARE PLAN IN RCRD: CPT | Performed by: INTERNAL MEDICINE

## 2024-03-12 PROCEDURE — 86376 MICROSOMAL ANTIBODY EACH: CPT

## 2024-03-12 PROCEDURE — 99213 OFFICE O/P EST LOW 20 MIN: CPT | Performed by: INTERNAL MEDICINE

## 2024-03-12 PROCEDURE — 86803 HEPATITIS C AB TEST: CPT

## 2024-03-12 PROCEDURE — G0439 PPPS, SUBSEQ VISIT: HCPCS | Performed by: INTERNAL MEDICINE

## 2024-03-12 PROCEDURE — 84481 FREE ASSAY (FT-3): CPT

## 2024-03-12 PROCEDURE — 84443 ASSAY THYROID STIM HORMONE: CPT

## 2024-03-12 PROCEDURE — G0009 ADMIN PNEUMOCOCCAL VACCINE: HCPCS | Performed by: INTERNAL MEDICINE

## 2024-03-12 PROCEDURE — 1159F MED LIST DOCD IN RCRD: CPT | Performed by: INTERNAL MEDICINE

## 2024-03-12 PROCEDURE — 99497 ADVNCD CARE PLAN 30 MIN: CPT | Performed by: INTERNAL MEDICINE

## 2024-03-12 PROCEDURE — 1160F RVW MEDS BY RX/DR IN RCRD: CPT | Performed by: INTERNAL MEDICINE

## 2024-03-12 PROCEDURE — 3077F SYST BP >= 140 MM HG: CPT | Performed by: INTERNAL MEDICINE

## 2024-03-12 PROCEDURE — 83036 HEMOGLOBIN GLYCOSYLATED A1C: CPT

## 2024-03-12 PROCEDURE — 36415 COLL VENOUS BLD VENIPUNCTURE: CPT

## 2024-03-12 RX ORDER — RAMIPRIL 1.25 MG/1
1.25 CAPSULE ORAL DAILY
Qty: 30 CAPSULE | Refills: 11 | Status: SHIPPED | OUTPATIENT
Start: 2024-03-12 | End: 2024-04-02 | Stop reason: WASHOUT

## 2024-03-12 RX ORDER — LOPERAMIDE HYDROCHLORIDE 2 MG/1
4 CAPSULE, LIQUID FILLED ORAL
COMMUNITY
Start: 2023-05-30

## 2024-03-12 ASSESSMENT — PATIENT HEALTH QUESTIONNAIRE - PHQ9
2. FEELING DOWN, DEPRESSED OR HOPELESS: NOT AT ALL
SUM OF ALL RESPONSES TO PHQ9 QUESTIONS 1 AND 2: 0
SUM OF ALL RESPONSES TO PHQ9 QUESTIONS 1 AND 2: 0
1. LITTLE INTEREST OR PLEASURE IN DOING THINGS: NOT AT ALL
2. FEELING DOWN, DEPRESSED OR HOPELESS: NOT AT ALL
1. LITTLE INTEREST OR PLEASURE IN DOING THINGS: NOT AT ALL

## 2024-03-12 ASSESSMENT — ACTIVITIES OF DAILY LIVING (ADL)
DRESSING: INDEPENDENT
GROCERY_SHOPPING: INDEPENDENT
DOING_HOUSEWORK: INDEPENDENT
TAKING_MEDICATION: INDEPENDENT
MANAGING_FINANCES: INDEPENDENT
BATHING: INDEPENDENT

## 2024-03-12 NOTE — PROGRESS NOTES
Assessment and Plan:  Hypertension with autoimmune disease given ramipril 1.25 mg a day BMP twice a year  Autoimmune arthritis seen by rheumatologist ophthalmologist dentist plan continue Plaquenil folic acid see rheumatologist ophthalmologist    Thyroid dysfunction check 334 TSH elevated T3 ultrasound of the thyroid for goiter and thyroid dysfunction    Osteopenia osteoarthritis vitamin C vitamin D calcium supplement    Mild systolic diastolic blood pressure given low-dose of ramipril  Chronic diarrhea seen by to gastroenterologist continue follow-up with the GI rule out any infection inflammation malignancy dietary advised  Follow-up in 4 weeks  Problem List Items Addressed This Visit       FLAVIA positive    Polymyalgia rheumatica (CMS/HCC) - Primary    Relevant Orders    XR DEXA bone density    Need for pneumococcal vaccine    Relevant Orders    Pneumococcal conjugate vaccine, 20-valent (PREVNAR 20) (Completed)    Chronic diarrhea of unknown origin    Relevant Orders    Hepatitis C antibody    XR DEXA bone density    Hemoglobin A1c    TSH with reflex to Free T4 if abnormal    T3, free    Thyroid Peroxidase (TPO) Antibody    T3, Reverse     Other Visit Diagnoses       Goiter        Relevant Orders    TSH with reflex to Free T4 if abnormal    T3, free    US thyroid    Thyroid Peroxidase (TPO) Antibody    T3, Reverse    Encounter for hepatitis C screening test for low risk patient        Relevant Orders    Hepatitis C antibody    Encounter for osteoporosis screening in asymptomatic postmenopausal patient        Relevant Orders    XR DEXA bone density    Abnormal thyroid screen (blood)        Relevant Orders    Hemoglobin A1c              Chief Complaint:   Annual Medicare Wellness Office Exam/Comprehensive Problem Focused Follow Up and Physical Exam chronic diarrhea thyroid dysfunction polyarthritis      HPI: This is a 68-year-old patient reviewed over have no children    1 stepson    History of the both hip surgery  cleft palate surgery    Brother have throat cancer    History of thyroid cancer    Mother of COPD    Father of colon cancer    Multiple family member had thyroid dysfunction including cancer    Personal history of chronic diarrhea seen by to gastroenterology workup ongoing    History of autoimmune arthritis seen by rheumatology ophthalmologist    Today came complaining of the thyroid dysfunction associated with the Silva arthralgia myalgia fatigue tired chronic diarrhea    Negative for suicidal mass ideation    Negative for jaundice hematuria rectal bleeding    Negative for steatorrhea        Patient Active Problem List:  Patient Active Problem List   Diagnosis    FLAVIA positive    Polymyalgia rheumatica (CMS/HCC)    Need for pneumococcal vaccine    Chronic diarrhea of unknown origin          Comprehensive Medical/Surgical/Social/Family History:  Family History   Problem Relation Name Age of Onset    Other (smoker) Mother      Emphysema Mother      Colon cancer Father         Past Medical History:   Diagnosis Date    Abnormal findings on diagnostic imaging of other specified body structures 06/21/2021    Abnormal CT scan, chest    Acute maxillary sinusitis, unspecified 05/28/2021    Acute maxillary sinusitis    Allergic rhinitis, unspecified 06/21/2021    Chronic allergic rhinitis    Cleft lip, bilateral 01/21/2018    Bilateral cleft lip, complete    Encounter for follow-up examination after completed treatment for conditions other than malignant neoplasm 01/11/2017    Follow-up for plastic surgery    Encounter for immunization 10/07/2021    Encounter for immunization    Encounter for screening for malignant neoplasm of vagina     Vaginal Pap smear    Encounter for screening mammogram for malignant neoplasm of breast 07/06/2021    Other screening mammogram    Generalized anxiety disorder 10/08/2021    JOVANA (generalized anxiety disorder)    Headache, unspecified 06/08/2021    Severe frontal headaches    Insomnia,  unspecified 09/12/2019    Insomnia, persistent    Migraine, unspecified, not intractable, without status migrainosus 06/21/2021    Migraine headache    Otalgia, right ear 04/15/2022    Right ear pain    Other bursitis of hip, left hip 01/21/2018    Bursitis of left hip    Other malaise 06/08/2021    Malaise and fatigue    Other skin changes 12/14/2016    Facial aging    Pain in right shoulder 06/08/2021    Right shoulder pain    Pain in unspecified hand 01/21/2018    Pain in hand and fingers    Personal history of diseases of the skin and subcutaneous tissue 01/04/2019    History of cellulitis    Personal history of other diseases of the musculoskeletal system and connective tissue 06/21/2021    History of fibromyalgia    Personal history of other diseases of the respiratory system 03/30/2017    History of acute sinusitis    Personal history of other diseases of the respiratory system 03/30/2017    History of pharyngitis    Personal history of other diseases of the respiratory system 03/22/2017    History of sinusitis    Personal history of other drug therapy 06/25/2021    History of postmenopausal hormone replacement therapy    Personal history of other endocrine, nutritional and metabolic disease 06/21/2021    History of vitamin D deficiency    Personal history of other mental and behavioral disorders 01/04/2019    History of anxiety disorder    Personal history of other mental and behavioral disorders 01/11/2017    History of anxiety disorder    Personal history of other specified conditions 06/21/2021    History of fatigue    Personal history of other specified conditions 10/07/2021    History of edema    Personal history of pneumonia (recurrent) 06/21/2021    History of pneumonia    Recurrent oral aphthae 03/22/2017    Canker sore    Unspecified mycosis 06/25/2021    Fungal infection of lung    Unspecified nonsuppurative otitis media, unspecified ear 03/17/2015    Middle ear effusion    Unspecified  osteoarthritis, unspecified site 10/08/2021    Osteoarthritis       Past Surgical History:   Procedure Laterality Date    CATARACT EXTRACTION  05/11/2016    Cataract Surgery    COLONOSCOPY  03/04/2014    Complete Colonoscopy    HIP SURGERY  03/04/2014    Hip Surgery Right    OOPHORECTOMY  05/11/2016    Oophorectomy    OTHER SURGICAL HISTORY  03/04/2014    Hip Surgery Left    OTHER SURGICAL HISTORY  05/11/2016    Palatoplasty For Cleft Palate    OTHER SURGICAL HISTORY  05/11/2016    Repair Of Brow Ptosis Left Upper Eyelid    RHINOPLASTY  05/11/2016    Rhinoplasty       Social History     Socioeconomic History    Marital status:      Spouse name: None    Number of children: None    Years of education: None    Highest education level: None   Occupational History    None   Tobacco Use    Smoking status: Never    Smokeless tobacco: Never   Substance and Sexual Activity    Alcohol use: Never    Drug use: Never    Sexual activity: None   Other Topics Concern    None   Social History Narrative    None     Social Determinants of Health     Financial Resource Strain: Not on file   Food Insecurity: Not on file   Transportation Needs: Not on file   Physical Activity: Not on file   Stress: Not on file   Social Connections: Not on file   Intimate Partner Violence: Not on file   Housing Stability: Not on file       Tobacco/Alcohol/Opioid use, as well as Illicit Drug Use was screened for/reviewed and documented in Social Documentation section of the chart and medication list as appropriate    Allergies and Medications  No Known Allergies  Current Outpatient Medications   Medication Sig Dispense Refill    hydroxychloroquine (Plaquenil) 200 mg tablet Take 2 tablets (400 mg) by mouth once daily.      Imodium A-D 2 mg capsule 2 capsules (4 mg).      predniSONE (Deltasone) 5 mg tablet Take 1 tablet (5 mg) by mouth once daily.       No current facility-administered medications for this visit.       Medications and Supplements   "prescribed by me and other practitioners or clinical pharmacist (such as prescriptions, OTC's, herbal therapies and supplements) were reviewed and documented in the medical record.     Advance directives  Advanced Care Planning (including a Living Will, Healthcare POA, as well as specific end of life choices and/or directives), was discussed for approximately 16 minutes with the patient and/or surrogate, voluntarily, and documented in the Problem List of the medical record.     Cardiac Risk Assessment  Cardiovascular risk was discussed and, if needed, lifestyle modifications recommended, including nutritional choices, exercise, and elimination of habits contributing to risk. We agreed on a plan to reduce the current cardiovascular risk based on above discussion as needed.  Aspirin use/disuse was discussed and documented in the Problem List of the medical record after reviewing the updated guidelines below:    Consider low dose Aspirin ( mg) use if the benefit for cardiovascular disease prevention outweighs risk for bleeding complications.   In general, low dose ASA should be considered:  In patients WITHOUT prior MI/stroke/PAD (primary prevention):   a. Age <60: Use if 10-year cardiovascular disease risk >20%, with discussion of risks and benefits with patient  b. Age 60-<70: Use if 10-year cardiovascular disease risk >20% and low bleeding (e.g., gastrointenstinal) risk, with discussion of risks and benefits with patient  c. Age >=70: Do not use    In patients WITH prior MI/stroke/PAD (secondary prevention):   Generally use unless extremely high bleeding (e.g., gastrointenstinal) risk, with discussion of risks and benefits with patient    ROS otherwise negative aside from what was mentioned above in HPI.    Visit Vitals  /84 (BP Location: Left arm, Patient Position: Sitting, BP Cuff Size: Adult)   Pulse 57   Temp 36 °C (96.8 °F)   Ht 1.778 m (5' 10\")   Wt 80.6 kg (177 lb 9.6 oz)   SpO2 98%   BMI 25.48 " kg/m²   Smoking Status Never   BSA 2 m²       Physical Exam  Physical Exam:    Appearance: Alert, oriented , cooperative,  in no acute distress. Well nourished & well hydrated.    Skin: Intact,  dry skin, no lesions, rash, petechiae or purpura.     Eyes: PERRLA, EOMs intact,  Conjunctiva pink with no redness or exudates. Cornea & anterior chamber are clear, Eyelids without lesions. No scleral icterus.     ENT: Nontender thyroid enlargement    Neck: Nontender thyroid enlargement  Pulmonary: Clear bilaterally with good chest wall excursion. No rales, rhonchi or wheezing. No accessory muscle use or stridor.    Cardiac: Normal S1, S2 without murmur, rub, gallop or extrasystole. No JVD, Carotids without bruits.    Abdomen: Generalized abdominal soreness.    Genitourinary: Exam deferred.    Musculoskeletal: Arthralgia myalgia medium and small joint bilaterally.    Neurological: Tingling numbness lower extremity  Psychiatric: Appropriate mood and affect.         During the course of the visit the patient was educated and counseled about age appropriate screening and preventive services. Completed preventive screenings were documented in the chart and orders were placed for outstanding screenings/procedures as documented in the Assessment and Plan.    Patient Instructions (the written plan) was given to the patient at check out.    Charting was completed using voice recognition technology and may include unintended errors.

## 2024-03-14 ENCOUNTER — TELEPHONE (OUTPATIENT)
Dept: PRIMARY CARE | Facility: CLINIC | Age: 69
End: 2024-03-14
Payer: MEDICARE

## 2024-03-14 NOTE — TELEPHONE ENCOUNTER
----- Message from Odalis Schmidt MA sent at 3/14/2024  1:52 PM EDT -----    ----- Message -----  From: Elaine Martinez MD  Sent: 3/14/2024  11:46 AM EDT  To: Odalis Schmidt MA     No significant findings in the thyroid. No suspicious nodule that requires follow-up imaging. A tiny right-sided macrocalcification is stable and not considered clinically significant.  Advised to see ENT for microcalcification Dr. Haro

## 2024-03-16 LAB — T3REVERSE SERPL-MCNC: 13.2 NG/DL (ref 9–27)

## 2024-03-20 ENCOUNTER — TELEPHONE (OUTPATIENT)
Dept: PRIMARY CARE | Facility: CLINIC | Age: 69
End: 2024-03-20
Payer: MEDICARE

## 2024-03-20 DIAGNOSIS — F41.9 ANXIETY: ICD-10-CM

## 2024-03-21 RX ORDER — BUSPIRONE HYDROCHLORIDE 5 MG/1
5 TABLET ORAL 3 TIMES DAILY
Qty: 45 TABLET | Refills: 0 | Status: SHIPPED | OUTPATIENT
Start: 2024-03-21 | End: 2024-04-02 | Stop reason: WASHOUT

## 2024-04-02 ENCOUNTER — TELEPHONE (OUTPATIENT)
Dept: PRIMARY CARE | Facility: CLINIC | Age: 69
End: 2024-04-02

## 2024-04-02 ENCOUNTER — OFFICE VISIT (OUTPATIENT)
Dept: PRIMARY CARE | Facility: CLINIC | Age: 69
End: 2024-04-02
Payer: MEDICARE

## 2024-04-02 VITALS
BODY MASS INDEX: 25.22 KG/M2 | DIASTOLIC BLOOD PRESSURE: 91 MMHG | WEIGHT: 176.2 LBS | HEART RATE: 58 BPM | HEIGHT: 70 IN | OXYGEN SATURATION: 96 % | SYSTOLIC BLOOD PRESSURE: 149 MMHG

## 2024-04-02 DIAGNOSIS — I10 BENIGN ESSENTIAL HYPERTENSION: Primary | ICD-10-CM

## 2024-04-02 DIAGNOSIS — R93.89 ABNORMAL ULTRASOUND OF THYROID GLAND: ICD-10-CM

## 2024-04-02 DIAGNOSIS — K52.9 CHRONIC DIARRHEA OF UNKNOWN ORIGIN: ICD-10-CM

## 2024-04-02 DIAGNOSIS — R76.8 ANA POSITIVE: ICD-10-CM

## 2024-04-02 DIAGNOSIS — G43.809 OTHER MIGRAINE WITHOUT STATUS MIGRAINOSUS, NOT INTRACTABLE: ICD-10-CM

## 2024-04-02 PROBLEM — M35.3 POLYMYALGIA RHEUMATICA (MULTI): Status: RESOLVED | Noted: 2024-03-12 | Resolved: 2024-04-02

## 2024-04-02 PROBLEM — Z23 NEED FOR PNEUMOCOCCAL VACCINE: Status: RESOLVED | Noted: 2024-03-12 | Resolved: 2024-04-02

## 2024-04-02 PROBLEM — E04.9 GOITER: Status: RESOLVED | Noted: 2024-03-12 | Resolved: 2024-04-02

## 2024-04-02 PROBLEM — R79.89 ABNORMAL THYROID SCREEN (BLOOD): Status: RESOLVED | Noted: 2024-03-12 | Resolved: 2024-04-02

## 2024-04-02 PROBLEM — Z00.00 MEDICARE ANNUAL WELLNESS VISIT, SUBSEQUENT: Status: RESOLVED | Noted: 2024-03-12 | Resolved: 2024-04-02

## 2024-04-02 PROCEDURE — 1159F MED LIST DOCD IN RCRD: CPT | Performed by: INTERNAL MEDICINE

## 2024-04-02 PROCEDURE — 3077F SYST BP >= 140 MM HG: CPT | Performed by: INTERNAL MEDICINE

## 2024-04-02 PROCEDURE — 1036F TOBACCO NON-USER: CPT | Performed by: INTERNAL MEDICINE

## 2024-04-02 PROCEDURE — 99214 OFFICE O/P EST MOD 30 MIN: CPT | Performed by: INTERNAL MEDICINE

## 2024-04-02 PROCEDURE — 1157F ADVNC CARE PLAN IN RCRD: CPT | Performed by: INTERNAL MEDICINE

## 2024-04-02 PROCEDURE — 3080F DIAST BP >= 90 MM HG: CPT | Performed by: INTERNAL MEDICINE

## 2024-04-02 PROCEDURE — 1160F RVW MEDS BY RX/DR IN RCRD: CPT | Performed by: INTERNAL MEDICINE

## 2024-04-02 RX ORDER — METOPROLOL SUCCINATE 25 MG/1
50 TABLET, EXTENDED RELEASE ORAL DAILY
Qty: 180 TABLET | Refills: 3 | Status: SHIPPED | OUTPATIENT
Start: 2024-04-02 | End: 2024-04-22 | Stop reason: ALTCHOICE

## 2024-04-02 NOTE — ASSESSMENT & PLAN NOTE
Autoimmune arthritis seen by rheumatologist continue Plaquenil folic acid steroid ophthalmologist rheumatology follow-up once a year

## 2024-04-02 NOTE — TELEPHONE ENCOUNTER
TOPHER [facility]     WAS GIVEN [facility] SAMPLES THIS MORNING AND LIKES THEM. WOULD LIKE RX SENT TO PHARMACY.

## 2024-04-02 NOTE — PROGRESS NOTES
Subjective   Patient ID: Jessika Gama is a 68 y.o. female who presents for Follow-up (Follow up, wants to discuss thyroid ).    Assessment/Plan     Problem List Items Addressed This Visit       FLAVIA positive     Autoimmune arthritis seen by rheumatologist continue Plaquenil folic acid steroid ophthalmologist rheumatology follow-up once a year         Chronic diarrhea of unknown origin     Advise follow-up with the infectious disease and GI         Benign essential hypertension - Primary     Patients BP readings reviewed and addressed, as we age our arteries turn stiffer and less elastic. Restricting salt consumption and staying physically fit with regular exercise regimen is the only way to keep our vasculature less tonic. Studies have shown that keeping ideal body wt, exercise routine about 140 to 150 minutes a week, eating variety of plant based diet and drinking plentiful water are quite helpful. Monitor BP twice or once a week at home and bring log to be reviewed by me. Uncontrolled BP has long term consequences including heart failure, myocardial infarction, accelerated atherosclerosis and kidney dysfunction. Therapy reviewed and explained.  Keep blood pressure less than 120/80 patient never took ramipril give Toprol-XL 50 mg at night BMP twice a year         Relevant Medications    metoprolol succinate XL (Toprol-XL) 25 mg 24 hr tablet    Abnormal ultrasound of thyroid gland     Seen by ENT advised if no better follow-up with endocrine Dr. Yeung          Patient was evaluated today, problem list was reviewed, problems and concerns addressed, Rx list reviewed and updated, lab and tests were noted and reviewed. Life style changes were discussed, always it works better if we eat plant based diet and plenty of fibres and roughage. Consume adequate amount of water and avoid alcohol, light to moderate physical activities and stress reduction are always beneficial for ongoing physical well being. Do not forget to  have 6 to 7 hours of sleep regularly and avoid late night erlin screen exposure.    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 chronic diarrhea seen by gastroenterology rheumatology ENT going to be evaluated by the endocrinologist for ongoing problem    Meanwhile hide hypertension not taking any medicine will start Toprol-XL 50 mg a day monitor heart rate blood pressure and BMP twice a year    Anxiety given BuSpar not helpful asking for Xanax will think about anxiety  Past Medical History:   Diagnosis Date    Abnormal findings on diagnostic imaging of other specified body structures 06/21/2021    Abnormal CT scan, chest    Acute maxillary sinusitis, unspecified 05/28/2021    Acute maxillary sinusitis    Allergic rhinitis, unspecified 06/21/2021    Chronic allergic rhinitis    Cleft lip, bilateral 01/21/2018    Bilateral cleft lip, complete    Encounter for follow-up examination after completed treatment for conditions other than malignant neoplasm 01/11/2017    Follow-up for plastic surgery    Encounter for immunization 10/07/2021    Encounter for immunization    Encounter for screening for malignant neoplasm of vagina     Vaginal Pap smear    Encounter for screening mammogram for malignant neoplasm of breast 07/06/2021    Other screening mammogram    Generalized anxiety disorder 10/08/2021    JOVANA (generalized anxiety disorder)    Headache, unspecified 06/08/2021    Severe frontal headaches    Insomnia, unspecified 09/12/2019    Insomnia, persistent    Migraine, unspecified, not intractable, without status migrainosus 06/21/2021    Migraine headache    Otalgia, right ear 04/15/2022    Right ear pain    Other bursitis of hip, left hip 01/21/2018    Bursitis of left hip    Other malaise 06/08/2021    Malaise and fatigue    Other skin changes 12/14/2016    Facial aging    Pain in right shoulder 06/08/2021    Right shoulder pain    Pain in unspecified hand 01/21/2018    Pain  in hand and fingers    Personal history of diseases of the skin and subcutaneous tissue 01/04/2019    History of cellulitis    Personal history of other diseases of the musculoskeletal system and connective tissue 06/21/2021    History of fibromyalgia    Personal history of other diseases of the respiratory system 03/30/2017    History of acute sinusitis    Personal history of other diseases of the respiratory system 03/30/2017    History of pharyngitis    Personal history of other diseases of the respiratory system 03/22/2017    History of sinusitis    Personal history of other drug therapy 06/25/2021    History of postmenopausal hormone replacement therapy    Personal history of other endocrine, nutritional and metabolic disease 06/21/2021    History of vitamin D deficiency    Personal history of other mental and behavioral disorders 01/04/2019    History of anxiety disorder    Personal history of other mental and behavioral disorders 01/11/2017    History of anxiety disorder    Personal history of other specified conditions 06/21/2021    History of fatigue    Personal history of other specified conditions 10/07/2021    History of edema    Personal history of pneumonia (recurrent) 06/21/2021    History of pneumonia    Recurrent oral aphthae 03/22/2017    Canker sore    Unspecified mycosis 06/25/2021    Fungal infection of lung    Unspecified nonsuppurative otitis media, unspecified ear 03/17/2015    Middle ear effusion    Unspecified osteoarthritis, unspecified site 10/08/2021    Osteoarthritis     Past Surgical History:   Procedure Laterality Date    CATARACT EXTRACTION  05/11/2016    Cataract Surgery    COLONOSCOPY  03/04/2014    Complete Colonoscopy    HIP SURGERY  03/04/2014    Hip Surgery Right    OOPHORECTOMY  05/11/2016    Oophorectomy    OTHER SURGICAL HISTORY  03/04/2014    Hip Surgery Left    OTHER SURGICAL HISTORY  05/11/2016    Palatoplasty For Cleft Palate    OTHER SURGICAL HISTORY  05/11/2016     Repair Of Brow Ptosis Left Upper Eyelid    RHINOPLASTY  05/11/2016    Rhinoplasty     No Known Allergies  Current Outpatient Medications   Medication Sig Dispense Refill    hydroxychloroquine (Plaquenil) 200 mg tablet Take 2 tablets (400 mg) by mouth once daily.      Imodium A-D 2 mg capsule 2 capsules (4 mg).      predniSONE (Deltasone) 5 mg tablet Take 1 tablet (5 mg) by mouth once daily.      metoprolol succinate XL (Toprol-XL) 25 mg 24 hr tablet Take 2 tablets (50 mg) by mouth once daily. Do not crush or chew. 180 tablet 3     No current facility-administered medications for this visit.     Family History   Problem Relation Name Age of Onset    Other (smoker) Mother      Emphysema Mother      Colon cancer Father       Social History     Socioeconomic History    Marital status:      Spouse name: None    Number of children: None    Years of education: None    Highest education level: None   Occupational History    None   Tobacco Use    Smoking status: Never    Smokeless tobacco: Never   Vaping Use    Vaping Use: Never used   Substance and Sexual Activity    Alcohol use: Never    Drug use: Never    Sexual activity: None   Other Topics Concern    None   Social History Narrative    None     Social Determinants of Health     Financial Resource Strain: Not on file   Food Insecurity: Not on file   Transportation Needs: Not on file   Physical Activity: Not on file   Stress: Not on file   Social Connections: Not on file   Intimate Partner Violence: Not on file   Housing Stability: Not on file     Immunization History   Administered Date(s) Administered    Influenza, High Dose Seasonal, Preservative Free 01/18/2018, 11/07/2018, 12/26/2019    Pfizer Purple Cap SARS-CoV-2 07/30/2021, 08/20/2021    Pneumococcal conjugate vaccine, 20-valent (PREVNAR 20) 03/12/2024    Tdap vaccine, age 7 year and older (BOOSTRIX, ADACEL) 01/11/2012       Review of Systems  Review of systems is otherwise negative unless stated above or in  "history of present illness.    Objective   Visit Vitals  BP (!) 149/91   Pulse 58   Ht 1.778 m (5' 10\")   Wt 79.9 kg (176 lb 3.2 oz)   SpO2 96%   BMI 25.28 kg/m²   Smoking Status Never   BSA 1.99 m²     Physical Exam  Constitutional:       General: not in acute distress.   HENT:      Head: Normocephalic and atraumatic.      Nose: Nose normal.   Eyes:      Extraocular Movements: Extraocular movements intact.      Conjunctiva/sclera: Conjunctivae normal.   Cardiovascular:      Rate and Rhythm: Normal rate ,  No M/R/G  Pulmonary:      Effort: Pulmonary effort is normal.      Breath sounds: Normal, Bilat Equal AE  Skin:     General: Skin is warm.   Neurological:      Mental Status: He is alert and oriented to person, place, and time.   Psychiatric: JOVANA   Mood and Affect: Mood normal.         Behavior: Behavior normal.   Musculoskeletal   FROM in all extremitirs,  Joint-no swelling or tenderness    Lab on 03/12/2024   Component Date Value Ref Range Status    Hepatitis C AB 03/12/2024 Nonreactive  Nonreactive Final    Hemoglobin A1C 03/12/2024 5.1  see below % Final    Estimated Average Glucose 03/12/2024 100  Not Established mg/dL Final    Thyroid Stimulating Hormone 03/12/2024 3.47  0.44 - 3.98 mIU/L Final    Triiodothyronine, Free 03/12/2024 3.1  2.3 - 4.2 pg/mL Final    Thyroid Peroxidase (TPO) Antibody 03/12/2024 39  <=60 IU/mL Final    T3, Reverse 03/12/2024 13.2  9.0 - 27.0 ng/dL Final   Lab on 12/11/2023   Component Date Value Ref Range Status    Amylase 12/11/2023 42  29 - 103 U/L Final    WBC 12/11/2023 8.2  4.4 - 11.3 x10*3/uL Final    nRBC 12/11/2023 0.0  0.0 - 0.0 /100 WBCs Final    RBC 12/11/2023 4.74  4.00 - 5.20 x10*6/uL Final    Hemoglobin 12/11/2023 13.1  12.0 - 16.0 g/dL Final    Hematocrit 12/11/2023 41.7  36.0 - 46.0 % Final    MCV 12/11/2023 88  80 - 100 fL Final    MCH 12/11/2023 27.6  26.0 - 34.0 pg Final    MCHC 12/11/2023 31.4 (L)  32.0 - 36.0 g/dL Final    RDW 12/11/2023 12.7  11.5 - 14.5 % " Final    Platelets 12/11/2023 296  150 - 450 x10*3/uL Final    Neutrophils % 12/11/2023 77.8  40.0 - 80.0 % Final    Immature Granulocytes %, Automated 12/11/2023 0.2  0.0 - 0.9 % Final    Lymphocytes % 12/11/2023 12.9  13.0 - 44.0 % Final    Monocytes % 12/11/2023 9.0  2.0 - 10.0 % Final    Eosinophils % 12/11/2023 0.0  0.0 - 6.0 % Final    Basophils % 12/11/2023 0.1  0.0 - 2.0 % Final    Neutrophils Absolute 12/11/2023 6.37  1.20 - 7.70 x10*3/uL Final    Immature Granulocytes Absolute, Au* 12/11/2023 0.02  0.00 - 0.70 x10*3/uL Final    Lymphocytes Absolute 12/11/2023 1.06 (L)  1.20 - 4.80 x10*3/uL Final    Monocytes Absolute 12/11/2023 0.74  0.10 - 1.00 x10*3/uL Final    Eosinophils Absolute 12/11/2023 0.00  0.00 - 0.70 x10*3/uL Final    Basophils Absolute 12/11/2023 0.01  0.00 - 0.10 x10*3/uL Final    Glucose 12/11/2023 135 (H)  74 - 99 mg/dL Final    Sodium 12/11/2023 138  136 - 145 mmol/L Final    Potassium 12/11/2023 4.1  3.5 - 5.3 mmol/L Final    Chloride 12/11/2023 101  98 - 107 mmol/L Final    Bicarbonate 12/11/2023 26  21 - 32 mmol/L Final    Anion Gap 12/11/2023 15  10 - 20 mmol/L Final    Urea Nitrogen 12/11/2023 19  6 - 23 mg/dL Final    Creatinine 12/11/2023 0.65  0.50 - 1.05 mg/dL Final    eGFR 12/11/2023 >90  >60 mL/min/1.73m*2 Final    Calcium 12/11/2023 9.1  8.6 - 10.6 mg/dL Final    Albumin 12/11/2023 4.6  3.4 - 5.0 g/dL Final    Alkaline Phosphatase 12/11/2023 53  33 - 136 U/L Final    Total Protein 12/11/2023 6.8  6.4 - 8.2 g/dL Final    AST 12/11/2023 15  9 - 39 U/L Final    Bilirubin, Total 12/11/2023 0.4  0.0 - 1.2 mg/dL Final    ALT 12/11/2023 17  7 - 45 U/L Final    Tissue Transglutaminase, IgA 12/11/2023 <1.0  <15.0 U/mL Final    Tissue Transglutamase IgG 12/11/2023 <1.0  <15.0 U/mL Final    Deamidated Gliadin Antibody IgA 12/11/2023 <1.0  <15.0 U/mL Final    Deamidated Gliadin Antibody IgG 12/11/2023 <1.0  <15.0 U/mL Final    Sedimentation Rate 12/11/2023 2  0 - 30 mm/h Final        Radiology: Reviewed imaging in powerchart.  US thyroid    Result Date: 3/13/2024  Ultrasound Thyroid CLINICAL HISTORY: E04.9, nodules COMPARISON: Ultrasound 3/23/2020 FINDINGS: Size right thyroid lobe: 4.2 x 1.4 x 1.3 cm Size left thyroid lobe: 4.1 x 1.4 x 1.2 cm Size isthmus: 0.3 cm Texture: Fairly homogeneous thyroid with normal vascularity. Estimated total number of nodules greater than or equal to 1 cm: 0 There is again demonstration of a tiny 6 mm macrocalcification in the midright lobe that is unchanged. No associated nodule is seen. No other focal thyroid abnormality. There are a few tiny colloid cysts in the right lobe of no clinical significance also. IMPRESSION:  No significant findings in the thyroid. No suspicious nodule that requires follow-up imaging. A tiny right-sided macrocalcification is stable and not considered clinically significant. Electronically signed by:  Stevenson Jones MD  03/13/2024 12:45 PM EDT Workstation: [id number] Technologist:  FRANKLYN Dictated By:   STEVENSON JONES MD Signed By:     STEVENSON JONES MD Signed Out:    03/13/24 12:45:23        Charting was completed using voice recognition technology and may include unintended errors.

## 2024-04-08 ENCOUNTER — TELEPHONE (OUTPATIENT)
Dept: PRIMARY CARE | Facility: CLINIC | Age: 69
End: 2024-04-08
Payer: MEDICARE

## 2024-04-08 NOTE — TELEPHONE ENCOUNTER
Dr Isha Robledo prescribed the meds.  Reason PT left this Dr is that she would order test that was out of the way for PT.

## 2024-04-08 NOTE — TELEPHONE ENCOUNTER
PT stated that she forgot to mention when she was in for her appointment on 4/2/24 that she needed her Alprazolam .5

## 2024-04-22 ENCOUNTER — TELEPHONE (OUTPATIENT)
Dept: PRIMARY CARE | Facility: CLINIC | Age: 69
End: 2024-04-22

## 2024-04-22 ENCOUNTER — OFFICE VISIT (OUTPATIENT)
Dept: PRIMARY CARE | Facility: CLINIC | Age: 69
End: 2024-04-22
Payer: MEDICARE

## 2024-04-22 VITALS
WEIGHT: 170 LBS | OXYGEN SATURATION: 98 % | HEART RATE: 62 BPM | DIASTOLIC BLOOD PRESSURE: 70 MMHG | BODY MASS INDEX: 24.34 KG/M2 | HEIGHT: 70 IN | SYSTOLIC BLOOD PRESSURE: 134 MMHG | TEMPERATURE: 97.1 F

## 2024-04-22 DIAGNOSIS — G44.009 CLUSTER HEADACHE, NOT INTRACTABLE, UNSPECIFIED CHRONICITY PATTERN: Primary | ICD-10-CM

## 2024-04-22 DIAGNOSIS — F41.9 ANXIETY: ICD-10-CM

## 2024-04-22 DIAGNOSIS — G44.009 CLUSTER HEADACHE, NOT INTRACTABLE, UNSPECIFIED CHRONICITY PATTERN: ICD-10-CM

## 2024-04-22 DIAGNOSIS — M35.1 MIXED CONNECTIVE TISSUE DISEASE (MULTI): ICD-10-CM

## 2024-04-22 DIAGNOSIS — K58.0 IRRITABLE BOWEL SYNDROME WITH DIARRHEA: ICD-10-CM

## 2024-04-22 PROBLEM — K52.9 CHRONIC DIARRHEA OF UNKNOWN ORIGIN: Status: RESOLVED | Noted: 2024-03-12 | Resolved: 2024-04-22

## 2024-04-22 PROBLEM — I10 BENIGN ESSENTIAL HYPERTENSION: Status: RESOLVED | Noted: 2024-04-02 | Resolved: 2024-04-22

## 2024-04-22 PROBLEM — R76.8 ANA POSITIVE: Status: RESOLVED | Noted: 2023-04-20 | Resolved: 2024-04-22

## 2024-04-22 PROBLEM — R93.89 ABNORMAL ULTRASOUND OF THYROID GLAND: Status: RESOLVED | Noted: 2024-04-02 | Resolved: 2024-04-22

## 2024-04-22 LAB
NON-UH HIE AMPHETAMINES, U: NEGATIVE
NON-UH HIE BARBITUATES, U: NEGATIVE
NON-UH HIE BENZODIAZEPINES, U: NEGATIVE
NON-UH HIE COCAINE, U: NEGATIVE
NON-UH HIE ECSTASY, U: NEGATIVE
NON-UH HIE FENTANYL, U: NEGATIVE
NON-UH HIE OPIATES, U: NEGATIVE
NON-UH HIE PCP, U: NEGATIVE
NON-UH HIE THC, U: NEGATIVE

## 2024-04-22 PROCEDURE — 99214 OFFICE O/P EST MOD 30 MIN: CPT | Performed by: INTERNAL MEDICINE

## 2024-04-22 PROCEDURE — 1160F RVW MEDS BY RX/DR IN RCRD: CPT | Performed by: INTERNAL MEDICINE

## 2024-04-22 PROCEDURE — 1157F ADVNC CARE PLAN IN RCRD: CPT | Performed by: INTERNAL MEDICINE

## 2024-04-22 PROCEDURE — 1159F MED LIST DOCD IN RCRD: CPT | Performed by: INTERNAL MEDICINE

## 2024-04-22 PROCEDURE — 1036F TOBACCO NON-USER: CPT | Performed by: INTERNAL MEDICINE

## 2024-04-22 PROCEDURE — G2211 COMPLEX E/M VISIT ADD ON: HCPCS | Performed by: INTERNAL MEDICINE

## 2024-04-22 RX ORDER — CITALOPRAM 20 MG/1
20 TABLET, FILM COATED ORAL DAILY
Qty: 30 TABLET | Refills: 0 | Status: SHIPPED | OUTPATIENT
Start: 2024-04-22

## 2024-04-22 RX ORDER — ALPRAZOLAM 0.5 MG/1
0.5 TABLET ORAL 3 TIMES DAILY PRN
Qty: 30 TABLET | Refills: 0 | Status: SHIPPED | OUTPATIENT
Start: 2024-04-22 | End: 2024-12-18

## 2024-04-22 ASSESSMENT — ENCOUNTER SYMPTOMS
OCCASIONAL FEELINGS OF UNSTEADINESS: 0
LOSS OF SENSATION IN FEET: 0
DEPRESSION: 0

## 2024-04-22 NOTE — ASSESSMENT & PLAN NOTE
Check sed rate CAT scan of the brain neurology evaluation given prednisone 10 mg twice a day for 1 week   English

## 2024-04-22 NOTE — TELEPHONE ENCOUNTER
Patient to call back with name of medication to be refilled - Tramadol 50 mg. Pharmacy is [facility].

## 2024-04-22 NOTE — PROGRESS NOTES
Subjective   Patient ID: Jessika Gama is a 68 y.o. female who presents for Headache (From a fall ) and Follow-up (Wants to discuss Xanax ).    Assessment/Plan     Problem List Items Addressed This Visit       Cluster headache, not intractable - Primary     Check sed rate CAT scan of the brain neurology evaluation given prednisone 10 mg twice a day for 1 week         Relevant Orders    CT head wo IV contrast    Anxiety     Not suicidal.high risk medication given Xanax and Celexa         Irritable bowel syndrome with diarrhea    Mixed connective tissue disease (Multi)     Patient was evaluated today, problem list was reviewed, problems and concerns addressed, Rx list reviewed and updated, lab and tests were noted and reviewed. Life style changes were discussed, always it works better if we eat plant based diet and plenty of fibres and roughage. Consume adequate amount of water and avoid alcohol, light to moderate physical activities and stress reduction are always beneficial for ongoing physical well being. Do not forget to have 6 to 7 hours of sleep regularly and avoid late night erlin screen exposure.    HPI 68-year-old patient noted mixed connective tissue disease IBS with diarrhea complaining of the accidental fall with a right sided forehead and periorbital injury with exacerbation of underlying headache with the photophobia phonophobia rhinorrhea without any loss of consciousness or balance or seizure-like activity    Also complaining anxiety depression panic disorder    Negative for SVT    Negative for seizure    Negative for loss of consciousness or seizure    Negative for nausea vomiting diarrhea constipation or recent traveling.  Seen by the gastroenterologist rheumatologist and neurologist    Make appointment with Dr. Stark order CAT scan and sed rate today  Past Medical History:   Diagnosis Date    Abnormal findings on diagnostic imaging of other specified body structures 06/21/2021    Abnormal CT scan,  chest    Acute maxillary sinusitis, unspecified 05/28/2021    Acute maxillary sinusitis    Allergic rhinitis, unspecified 06/21/2021    Chronic allergic rhinitis    Cleft lip, bilateral ([facility]) 01/21/2018    Bilateral cleft lip, complete    Encounter for follow-up examination after completed treatment for conditions other than malignant neoplasm 01/11/2017    Follow-up for plastic surgery    Encounter for immunization 10/07/2021    Encounter for immunization    Encounter for screening for malignant neoplasm of vagina     Vaginal Pap smear    Encounter for screening mammogram for malignant neoplasm of breast 07/06/2021    Other screening mammogram    Generalized anxiety disorder 10/08/2021    JOVANA (generalized anxiety disorder)    Headache, unspecified 06/08/2021    Severe frontal headaches    Insomnia, unspecified 09/12/2019    Insomnia, persistent    Migraine, unspecified, not intractable, without status migrainosus 06/21/2021    Migraine headache    Otalgia, right ear 04/15/2022    Right ear pain    Other bursitis of hip, left hip 01/21/2018    Bursitis of left hip    Other malaise 06/08/2021    Malaise and fatigue    Other skin changes 12/14/2016    Facial aging    Pain in right shoulder 06/08/2021    Right shoulder pain    Pain in unspecified hand 01/21/2018    Pain in hand and fingers    Personal history of diseases of the skin and subcutaneous tissue 01/04/2019    History of cellulitis    Personal history of other diseases of the musculoskeletal system and connective tissue 06/21/2021    History of fibromyalgia    Personal history of other diseases of the respiratory system 03/30/2017    History of acute sinusitis    Personal history of other diseases of the respiratory system 03/30/2017    History of pharyngitis    Personal history of other diseases of the respiratory system 03/22/2017    History of sinusitis    Personal history of other drug therapy 06/25/2021    History of postmenopausal hormone  replacement therapy    Personal history of other endocrine, nutritional and metabolic disease 06/21/2021    History of vitamin D deficiency    Personal history of other mental and behavioral disorders 01/04/2019    History of anxiety disorder    Personal history of other mental and behavioral disorders 01/11/2017    History of anxiety disorder    Personal history of other specified conditions 06/21/2021    History of fatigue    Personal history of other specified conditions 10/07/2021    History of edema    Personal history of pneumonia (recurrent) 06/21/2021    History of pneumonia    Recurrent oral aphthae 03/22/2017    Canker sore    Unspecified mycosis 06/25/2021    Fungal infection of lung    Unspecified nonsuppurative otitis media, unspecified ear 03/17/2015    Middle ear effusion    Unspecified osteoarthritis, unspecified site 10/08/2021    Osteoarthritis     Past Surgical History:   Procedure Laterality Date    CATARACT EXTRACTION  05/11/2016    Cataract Surgery    COLONOSCOPY  03/04/2014    Complete Colonoscopy    HIP SURGERY  03/04/2014    Hip Surgery Right    OOPHORECTOMY  05/11/2016    Oophorectomy    OTHER SURGICAL HISTORY  03/04/2014    Hip Surgery Left    OTHER SURGICAL HISTORY  05/11/2016    Palatoplasty For Cleft Palate    OTHER SURGICAL HISTORY  05/11/2016    Repair Of Brow Ptosis Left Upper Eyelid    RHINOPLASTY  05/11/2016    Rhinoplasty     No Known Allergies  Current Outpatient Medications   Medication Sig Dispense Refill    hydroxychloroquine (Plaquenil) 200 mg tablet Take 2 tablets (400 mg) by mouth once daily.      Imodium A-D 2 mg capsule 2 capsules (4 mg).      predniSONE (Deltasone) 5 mg tablet Take 1 tablet (5 mg) by mouth once daily.       No current facility-administered medications for this visit.     Family History   Problem Relation Name Age of Onset    Other (smoker) Mother      Emphysema Mother      Colon cancer Father       Social History     Socioeconomic History    Marital  "status:      Spouse name: None    Number of children: None    Years of education: None    Highest education level: None   Occupational History    None   Tobacco Use    Smoking status: Never    Smokeless tobacco: Never   Vaping Use    Vaping status: Never Used   Substance and Sexual Activity    Alcohol use: Never    Drug use: Never    Sexual activity: None   Other Topics Concern    None   Social History Narrative    None     Social Determinants of Health     Financial Resource Strain: Not on file   Food Insecurity: Not on file   Transportation Needs: Not on file   Physical Activity: Not on file   Stress: Not on file   Social Connections: Not on file   Intimate Partner Violence: Not on file   Housing Stability: Not on file     Immunization History   Administered Date(s) Administered    Flu vaccine (IIV4), preservative free *Check age/dose* 11/07/2018, 12/26/2019    Influenza, High Dose Seasonal, Preservative Free 01/18/2018, 11/07/2018, 12/26/2019    Pfizer Purple Cap SARS-CoV-2 07/30/2021, 08/20/2021    Pneumococcal conjugate vaccine, 20-valent (PREVNAR 20) 03/12/2024    Tdap vaccine, age 7 year and older (BOOSTRIX, ADACEL) 01/11/2012       Review of Systems  Review of systems is otherwise negative unless stated above or in history of present illness.    Objective   Visit Vitals  /70 (BP Location: Left arm, Patient Position: Sitting, BP Cuff Size: Adult)   Pulse 62   Temp 36.2 °C (97.1 °F)   Ht 1.778 m (5' 10\")   Wt 77.1 kg (170 lb)   SpO2 98%   BMI 24.39 kg/m²   Smoking Status Never   BSA 1.95 m²     Physical Exam  Constitutional: Cephalgia anxiety     General: not in acute distress.   HENT: Right frontal headache with tenderness     Head: Normocephalic and atraumatic.      Nose: Nose normal.   Eyes: No diplopia cular Movements: Extraocular movements intact.      Conjunctiva/sclera: Conjunctivae normal.   Cardiovascular: S4     Rate and Rhythm: Normal rate ,  No M/R/G  Pulmonary:      Effort: Pulmonary " effort is normal.      Breath sounds: Normal, Bilat Equal AE  Skin: Posttraumatic bruise right forehead     General: Skin is warm.   Neurological: Cluster headache without neurological signs symptoms e is alert and oriented to person, place, and time.   Psychiatric:         Mood and Affect: Mood normal.         Behavior: Behavior normal.   Musculoskeletal posttraumatic bruise and tenderness right frontotemporal area  FROM in all extremitirs,  Joint-no swelling or tenderness    Lab on 03/12/2024   Component Date Value Ref Range Status    Hepatitis C AB 03/12/2024 Nonreactive  Nonreactive Final    Hemoglobin A1C 03/12/2024 5.1  see below % Final    Estimated Average Glucose 03/12/2024 100  Not Established mg/dL Final    Thyroid Stimulating Hormone 03/12/2024 3.47  0.44 - 3.98 mIU/L Final    Triiodothyronine, Free 03/12/2024 3.1  2.3 - 4.2 pg/mL Final    Thyroid Peroxidase (TPO) Antibody 03/12/2024 39  <=60 IU/mL Final    T3, Reverse 03/12/2024 13.2  9.0 - 27.0 ng/dL Final       Radiology: Reviewed imaging in powerchart.  No results found.      Charting was completed using voice recognition technology and may include unintended errors.

## 2024-04-23 NOTE — TELEPHONE ENCOUNTER
PATIENT CALLED IN TODAY AND SAID THAT DR RAMON A MEDICAL DR PRESCRIBED TRAMADOL FOR HER. SHE IS ASKING THAT DR PRESCRIBE SOMETHING ELSE FOR HEADACHES IF HE WONT GIVE HER TRADADOL  PLEASE ADVISE   details… normal/soft/nontender/nondistended/normal active bowel sounds

## 2024-04-25 RX ORDER — METHYLPREDNISOLONE 4 MG/1
TABLET ORAL
Qty: 21 TABLET | Refills: 0 | Status: SHIPPED | OUTPATIENT
Start: 2024-04-25 | End: 2024-05-02

## 2024-04-26 ENCOUNTER — TELEPHONE (OUTPATIENT)
Dept: PRIMARY CARE | Facility: CLINIC | Age: 69
End: 2024-04-26
Payer: MEDICARE

## 2024-04-26 NOTE — TELEPHONE ENCOUNTER
----- Message from Elaine Martinez MD sent at 4/25/2024  2:51 PM EDT -----  Take aspirin 81 mg a day

## 2024-06-21 ENCOUNTER — TELEPHONE (OUTPATIENT)
Dept: PRIMARY CARE | Facility: CLINIC | Age: 69
End: 2024-06-21
Payer: MEDICARE

## 2024-06-21 DIAGNOSIS — F41.9 ANXIETY: ICD-10-CM

## 2024-06-21 RX ORDER — ALPRAZOLAM 0.5 MG/1
0.5 TABLET ORAL 3 TIMES DAILY PRN
Qty: 30 TABLET | Refills: 0 | Status: SHIPPED | OUTPATIENT
Start: 2024-06-21 | End: 2025-02-16

## 2024-07-12 ENCOUNTER — APPOINTMENT (OUTPATIENT)
Dept: PRIMARY CARE | Facility: CLINIC | Age: 69
End: 2024-07-12
Payer: MEDICARE

## 2024-08-01 ENCOUNTER — OFFICE VISIT (OUTPATIENT)
Dept: PRIMARY CARE | Facility: CLINIC | Age: 69
End: 2024-08-01
Payer: MEDICARE

## 2024-08-01 VITALS
BODY MASS INDEX: 22.19 KG/M2 | OXYGEN SATURATION: 96 % | HEART RATE: 55 BPM | WEIGHT: 155 LBS | HEIGHT: 70 IN | SYSTOLIC BLOOD PRESSURE: 155 MMHG | DIASTOLIC BLOOD PRESSURE: 83 MMHG

## 2024-08-01 DIAGNOSIS — M35.1 MIXED CONNECTIVE TISSUE DISEASE (MULTI): ICD-10-CM

## 2024-08-01 DIAGNOSIS — D50.0 IRON DEFICIENCY ANEMIA DUE TO CHRONIC BLOOD LOSS: ICD-10-CM

## 2024-08-01 DIAGNOSIS — M35.3 PMR (POLYMYALGIA RHEUMATICA) (MULTI): Primary | ICD-10-CM

## 2024-08-01 DIAGNOSIS — F41.9 ANXIETY: ICD-10-CM

## 2024-08-01 DIAGNOSIS — Z12.31 SCREENING MAMMOGRAM FOR BREAST CANCER: ICD-10-CM

## 2024-08-01 DIAGNOSIS — E55.9 VITAMIN D DEFICIENCY: ICD-10-CM

## 2024-08-01 LAB
NON-UH HIE FERRITIN: 39 NG/ML (ref 10–291)
NON-UH HIE FOLATE: 16.5 NG/ML (ref 5.4–17.5)
NON-UH HIE HAPTOGLOBIN: 115 MG/DL (ref 30–200)
NON-UH HIE HCT: 40.4 % (ref 36–46)
NON-UH HIE HCT: 40.4 % (ref 36–46)
NON-UH HIE HGB: 13.1 G/DL (ref 12–16)
NON-UH HIE IMMATURE RETIC FRACTION: 0.26 (ref 0.2–0.5)
NON-UH HIE INSTR WBC ND: 6.8
NON-UH HIE IRON: 76 UG/DL (ref 50–170)
NON-UH HIE MCH: 27.6 PG (ref 27–34)
NON-UH HIE MCHC: 32.5 G/DL (ref 32–37)
NON-UH HIE MCV: 84.8 FL (ref 80–100)
NON-UH HIE MPV: 8.9 FL (ref 7.4–10.4)
NON-UH HIE PLATELET: 271 X10 (ref 150–450)
NON-UH HIE RBC: 4.76 X10 (ref 4.2–5.4)
NON-UH HIE RBC: 4.76 X10 (ref 4.2–5.4)
NON-UH HIE RDW: 15.1 % (ref 11.5–14.5)
NON-UH HIE RETIC ABSOLUTE: 30 X10 (ref 22–110)
NON-UH HIE RETIC CORRECTED: NORMAL %
NON-UH HIE RETIC COUNT: 0.6 % (ref 0.5–2.5)
NON-UH HIE SATURATION: 20.9 % (ref 20–50)
NON-UH HIE SED RATE WESTERGREN: 6 MM/HR (ref 0–30)
NON-UH HIE TIBC: 363 UG/ML (ref 250–425)
NON-UH HIE VIT D 25: 29 NG/ML
NON-UH HIE VITAMIN B12: ABNORMAL PG/ML (ref 211–911)
NON-UH HIE WBC: 6.8 X10 (ref 4.5–11)

## 2024-08-01 PROCEDURE — 1159F MED LIST DOCD IN RCRD: CPT | Performed by: INTERNAL MEDICINE

## 2024-08-01 PROCEDURE — 99214 OFFICE O/P EST MOD 30 MIN: CPT | Performed by: INTERNAL MEDICINE

## 2024-08-01 PROCEDURE — 3008F BODY MASS INDEX DOCD: CPT | Performed by: INTERNAL MEDICINE

## 2024-08-01 PROCEDURE — 1160F RVW MEDS BY RX/DR IN RCRD: CPT | Performed by: INTERNAL MEDICINE

## 2024-08-01 PROCEDURE — 1036F TOBACCO NON-USER: CPT | Performed by: INTERNAL MEDICINE

## 2024-08-01 PROCEDURE — G2211 COMPLEX E/M VISIT ADD ON: HCPCS | Performed by: INTERNAL MEDICINE

## 2024-08-01 PROCEDURE — 96372 THER/PROPH/DIAG INJ SC/IM: CPT | Performed by: INTERNAL MEDICINE

## 2024-08-01 PROCEDURE — 1157F ADVNC CARE PLAN IN RCRD: CPT | Performed by: INTERNAL MEDICINE

## 2024-08-01 RX ORDER — CYANOCOBALAMIN 1000 UG/ML
1000 INJECTION, SOLUTION INTRAMUSCULAR; SUBCUTANEOUS ONCE
Status: COMPLETED | OUTPATIENT
Start: 2024-08-01 | End: 2024-08-01

## 2024-08-01 RX ORDER — TRIAMCINOLONE ACETONIDE 40 MG/ML
40 INJECTION, SUSPENSION INTRA-ARTICULAR; INTRAMUSCULAR ONCE
Status: COMPLETED | OUTPATIENT
Start: 2024-08-01 | End: 2024-08-01

## 2024-08-01 NOTE — PROGRESS NOTES
Subjective   Patient ID: Jessika Gama is a 68 y.o. female who presents for Fatigue.    Assessment/Plan     Problem List Items Addressed This Visit       PMR (polymyalgia rheumatica) (Multi) - Primary     Seen by rheumatologist recently change of the weather exacerbation arthralgia myalgia fatigue tired possible polymyalgia rheumatica check the sed rate CPK autoimmune workup given B12 1 cc Kenalog 40 intramuscular if no better follow-up with the rheumatologist and ophthalmologist         Screening mammogram for breast cancer    Relevant Orders    BI mammo bilateral screening tomosynthesis    Anxiety     PHQ less than 6 not suicidal         Mixed connective tissue disease (Multi)     Continue Plaquenil folic acid and see ophthalmologist         Relevant Orders    BI mammo bilateral screening tomosynthesis    CBC    Ferritin    Folate    Haptoglobin    Iron and TIBC    Vitamin B12    Reticulocytes    Sedimentation rate, automated    Iron deficiency anemia due to chronic blood loss     Iron B12 folic acid refer to the GI         Relevant Orders    BI mammo bilateral screening tomosynthesis    CBC    Ferritin    Folate    Haptoglobin    Iron and TIBC    Vitamin B12    Reticulocytes    Sedimentation rate, automated    Vitamin D deficiency    Relevant Orders    Vitamin D 25-Hydroxy,Total (for eval of Vitamin D levels)     Patient was evaluated today, problem list was reviewed, problems and concerns addressed, Rx list reviewed and updated, lab and tests were noted and reviewed. Life style changes were discussed, always it works better if we eat plant based diet and plenty of fibres and roughage. Consume adequate amount of water and avoid alcohol, light to moderate physical activities and stress reduction are always beneficial for ongoing physical well being. Do not forget to have 6 to 7 hours of sleep regularly and avoid late night erlin screen exposure.    HPI 68-year-old patient reviewed over read anxiety depression  fibromyalgia complaining arthralgia myalgia fatigue tired weakness onset acutely duration 1 week progressed acutely aggravating factor change of the weather stress level physical mental stress affecting the multiple joint aches and pain and fatigue and tiredness    Negative for headache dyspnea PND orthopnea    Negative for visible hematuria rectal bleeding or jaundice    Negative for pruritus    Negative for viral syndrome      Past Medical History:   Diagnosis Date    Abnormal findings on diagnostic imaging of other specified body structures 06/21/2021    Abnormal CT scan, chest    Acute maxillary sinusitis, unspecified 05/28/2021    Acute maxillary sinusitis    Allergic rhinitis, unspecified 06/21/2021    Chronic allergic rhinitis    Cleft lip, bilateral ([facility]) 01/21/2018    Bilateral cleft lip, complete    Encounter for follow-up examination after completed treatment for conditions other than malignant neoplasm 01/11/2017    Follow-up for plastic surgery    Encounter for immunization 10/07/2021    Encounter for immunization    Encounter for screening for malignant neoplasm of vagina     Vaginal Pap smear    Encounter for screening mammogram for malignant neoplasm of breast 07/06/2021    Other screening mammogram    Generalized anxiety disorder 10/08/2021    JOVANA (generalized anxiety disorder)    Headache, unspecified 06/08/2021    Severe frontal headaches    Insomnia, unspecified 09/12/2019    Insomnia, persistent    Migraine, unspecified, not intractable, without status migrainosus 06/21/2021    Migraine headache    Otalgia, right ear 04/15/2022    Right ear pain    Other bursitis of hip, left hip 01/21/2018    Bursitis of left hip    Other malaise 06/08/2021    Malaise and fatigue    Other skin changes 12/14/2016    Facial aging    Pain in right shoulder 06/08/2021    Right shoulder pain    Pain in unspecified hand 01/21/2018    Pain in hand and fingers    Personal history of diseases of the skin and  subcutaneous tissue 01/04/2019    History of cellulitis    Personal history of other diseases of the musculoskeletal system and connective tissue 06/21/2021    History of fibromyalgia    Personal history of other diseases of the respiratory system 03/30/2017    History of acute sinusitis    Personal history of other diseases of the respiratory system 03/30/2017    History of pharyngitis    Personal history of other diseases of the respiratory system 03/22/2017    History of sinusitis    Personal history of other drug therapy 06/25/2021    History of postmenopausal hormone replacement therapy    Personal history of other endocrine, nutritional and metabolic disease 06/21/2021    History of vitamin D deficiency    Personal history of other mental and behavioral disorders 01/04/2019    History of anxiety disorder    Personal history of other mental and behavioral disorders 01/11/2017    History of anxiety disorder    Personal history of other specified conditions 06/21/2021    History of fatigue    Personal history of other specified conditions 10/07/2021    History of edema    Personal history of pneumonia (recurrent) 06/21/2021    History of pneumonia    Recurrent oral aphthae 03/22/2017    Canker sore    Unspecified mycosis 06/25/2021    Fungal infection of lung    Unspecified nonsuppurative otitis media, unspecified ear 03/17/2015    Middle ear effusion    Unspecified osteoarthritis, unspecified site 10/08/2021    Osteoarthritis     Past Surgical History:   Procedure Laterality Date    CATARACT EXTRACTION  05/11/2016    Cataract Surgery    COLONOSCOPY  03/04/2014    Complete Colonoscopy    HIP SURGERY  03/04/2014    Hip Surgery Right    OOPHORECTOMY  05/11/2016    Oophorectomy    OTHER SURGICAL HISTORY  03/04/2014    Hip Surgery Left    OTHER SURGICAL HISTORY  05/11/2016    Palatoplasty For Cleft Palate    OTHER SURGICAL HISTORY  05/11/2016    Repair Of Brow Ptosis Left Upper Eyelid    RHINOPLASTY  05/11/2016     "Rhinoplasty     No Known Allergies  Current Outpatient Medications   Medication Sig Dispense Refill    ALPRAZolam (Xanax) 0.5 mg tablet Take 1 tablet (0.5 mg) by mouth 3 times a day as needed for anxiety. 30 tablet 0    citalopram (CeleXA) 20 mg tablet Take 1 tablet (20 mg) by mouth once daily. 30 tablet 0    hydroxychloroquine (Plaquenil) 200 mg tablet Take 2 tablets (400 mg) by mouth once daily.      Imodium A-D 2 mg capsule 2 capsules (4 mg).      predniSONE (Deltasone) 5 mg tablet Take 1 tablet (5 mg) by mouth once daily.       No current facility-administered medications for this visit.     Family History   Problem Relation Name Age of Onset    Other (smoker) Mother      Emphysema Mother      Colon cancer Father       Social History     Socioeconomic History    Marital status:    Tobacco Use    Smoking status: Never    Smokeless tobacco: Never   Vaping Use    Vaping status: Never Used   Substance and Sexual Activity    Alcohol use: Never    Drug use: Never     Immunization History   Administered Date(s) Administered    Flu vaccine (IIV4), preservative free *Check age/dose* 11/07/2018, 12/26/2019    Influenza, High Dose Seasonal, Preservative Free 01/18/2018, 11/07/2018, 12/26/2019    Pfizer Purple Cap SARS-CoV-2 07/30/2021, 08/20/2021    Pneumococcal conjugate vaccine, 20-valent (PREVNAR 20) 03/12/2024    Tdap vaccine, age 7 year and older (BOOSTRIX, ADACEL) 01/11/2012, 05/12/2024       Review of Systems  Review of systems is otherwise negative unless stated above or in history of present illness.    Objective   Visit Vitals  /83 (BP Location: Left arm, Patient Position: Sitting, BP Cuff Size: Adult)   Pulse 55   Ht 1.778 m (5' 10\")   Wt 70.3 kg (155 lb)   SpO2 96%   BMI 22.24 kg/m²   Smoking Status Never   BSA 1.86 m²     Physical Exam  Constitutional: BMI 22     General: not in acute distress.   HENT:      Head: Normocephalic and atraumatic.      Nose: Nose normal.   Eyes: No jaundice     " Extraocular Movements: Extraocular movements intact.      Conjunctiva/sclera: Conjunctivae normal.   Cardiovascular: Heart murmur     Rate and Rhythm: Normal rate ,  No M/R/G  Pulmonary:      Effort: Pulmonary effort is normal.      Breath sounds: Normal, Bilat Equal AE  Skin: Dry skin     General: Skin is warm.   Neurological: Moderate anxiety     Mental Status: He is alert and oriented to person, place, and time.   Psychiatric:   Anxiety   Mood and Affect: Mood normal.         Behavior: Behavior normal.   Musculoskeletal multiple muscle and joint tenderness specially medium and small size FROM in all extremitirs,  Joint-no swelling or tenderness    Orders Only on 04/22/2024   Component Date Value Ref Range Status    [facility] Fentanyl, U 04/22/2024 Negative   Final    [facility] Amphetamines, U 04/22/2024 Negative   Final    [facility] Cocaine, U 04/22/2024 Negative   Final    [facility] Barbituates, U 04/22/2024 Negative   Final    [facility] THC, U 04/22/2024 Negative   Final    [facility] Opiates, U 04/22/2024 Negative   Final    [facility] PCP, U 04/22/2024 Negative   Final    [facility] Benzodiazepines, U 04/22/2024 Negative   Final    [facility] Ecstasy, U 04/22/2024 Negative   Final       Radiology: Reviewed imaging in powerchart.  No results found.      Charting was completed using voice recognition technology and may include unintended errors.

## 2024-08-01 NOTE — ASSESSMENT & PLAN NOTE
Seen by rheumatologist recently change of the weather exacerbation arthralgia myalgia fatigue tired possible polymyalgia rheumatica check the sed rate CPK autoimmune workup given B12 1 cc Kenalog 40 intramuscular if no better follow-up with the rheumatologist and ophthalmologist

## 2024-08-02 ENCOUNTER — TELEPHONE (OUTPATIENT)
Dept: PRIMARY CARE | Facility: CLINIC | Age: 69
End: 2024-08-02
Payer: MEDICARE

## 2024-08-02 NOTE — TELEPHONE ENCOUNTER
----- Message from Elaine Martinez sent at 8/2/2024  9:32 AM EDT -----  B12 is more than 20,000 no need for B12 anymore vitamin D folic acid iron normal follow-up in office for your blood pressure was little high 155/83 in the 2 weeks to check your blood pressure and bring all medicine with you

## 2024-08-16 ENCOUNTER — APPOINTMENT (OUTPATIENT)
Dept: PRIMARY CARE | Facility: CLINIC | Age: 69
End: 2024-08-16
Payer: MEDICARE

## 2024-08-26 ENCOUNTER — APPOINTMENT (OUTPATIENT)
Dept: PRIMARY CARE | Facility: CLINIC | Age: 69
End: 2024-08-26
Payer: MEDICARE

## 2024-09-09 ENCOUNTER — APPOINTMENT (OUTPATIENT)
Dept: PRIMARY CARE | Facility: CLINIC | Age: 69
End: 2024-09-09
Payer: MEDICARE

## 2024-09-09 ENCOUNTER — LAB (OUTPATIENT)
Dept: LAB | Facility: LAB | Age: 69
End: 2024-09-09
Payer: MEDICARE

## 2024-09-09 VITALS
TEMPERATURE: 96.3 F | BODY MASS INDEX: 23.39 KG/M2 | HEIGHT: 70 IN | HEART RATE: 70 BPM | OXYGEN SATURATION: 98 % | DIASTOLIC BLOOD PRESSURE: 60 MMHG | WEIGHT: 163.4 LBS | SYSTOLIC BLOOD PRESSURE: 122 MMHG

## 2024-09-09 DIAGNOSIS — F32.4 MAJOR DEPRESSIVE DISORDER WITH SINGLE EPISODE, IN PARTIAL REMISSION (CMS-HCC): Primary | ICD-10-CM

## 2024-09-09 DIAGNOSIS — R79.89 HIGH SERUM VITAMIN B12: ICD-10-CM

## 2024-09-09 DIAGNOSIS — M35.1 MIXED CONNECTIVE TISSUE DISEASE (MULTI): ICD-10-CM

## 2024-09-09 DIAGNOSIS — F41.9 ANXIETY: ICD-10-CM

## 2024-09-09 DIAGNOSIS — Z79.899 MEDICATION MANAGEMENT: ICD-10-CM

## 2024-09-09 DIAGNOSIS — Z23 NEED FOR INFLUENZA VACCINATION: ICD-10-CM

## 2024-09-09 PROBLEM — M35.3 PMR (POLYMYALGIA RHEUMATICA) (MULTI): Status: RESOLVED | Noted: 2024-03-12 | Resolved: 2024-09-09

## 2024-09-09 PROBLEM — E55.9 VITAMIN D DEFICIENCY: Status: RESOLVED | Noted: 2024-08-01 | Resolved: 2024-09-09

## 2024-09-09 PROBLEM — G44.009 CLUSTER HEADACHE, NOT INTRACTABLE: Status: RESOLVED | Noted: 2024-04-22 | Resolved: 2024-09-09

## 2024-09-09 PROBLEM — D50.0 IRON DEFICIENCY ANEMIA DUE TO CHRONIC BLOOD LOSS: Status: RESOLVED | Noted: 2024-08-01 | Resolved: 2024-09-09

## 2024-09-09 PROBLEM — K58.0 IRRITABLE BOWEL SYNDROME WITH DIARRHEA: Status: RESOLVED | Noted: 2024-04-22 | Resolved: 2024-09-09

## 2024-09-09 LAB
AMPHETAMINES UR QL SCN: NORMAL
BARBITURATES UR QL SCN: NORMAL
BENZODIAZ UR QL SCN: NORMAL
BZE UR QL SCN: NORMAL
CANNABINOIDS UR QL SCN: NORMAL
FENTANYL+NORFENTANYL UR QL SCN: NORMAL
METHADONE UR QL SCN: NORMAL
OPIATES UR QL SCN: NORMAL
OXYCODONE+OXYMORPHONE UR QL SCN: NORMAL
PCP UR QL SCN: NORMAL
VIT B12 SERPL-MCNC: 243 PG/ML (ref 211–911)

## 2024-09-09 PROCEDURE — 99214 OFFICE O/P EST MOD 30 MIN: CPT | Performed by: INTERNAL MEDICINE

## 2024-09-09 PROCEDURE — 90662 IIV NO PRSV INCREASED AG IM: CPT | Performed by: INTERNAL MEDICINE

## 2024-09-09 PROCEDURE — 82607 VITAMIN B-12: CPT

## 2024-09-09 PROCEDURE — 1123F ACP DISCUSS/DSCN MKR DOCD: CPT | Performed by: INTERNAL MEDICINE

## 2024-09-09 PROCEDURE — 1160F RVW MEDS BY RX/DR IN RCRD: CPT | Performed by: INTERNAL MEDICINE

## 2024-09-09 PROCEDURE — 1157F ADVNC CARE PLAN IN RCRD: CPT | Performed by: INTERNAL MEDICINE

## 2024-09-09 PROCEDURE — 1036F TOBACCO NON-USER: CPT | Performed by: INTERNAL MEDICINE

## 2024-09-09 PROCEDURE — 80307 DRUG TEST PRSMV CHEM ANLYZR: CPT

## 2024-09-09 PROCEDURE — G2211 COMPLEX E/M VISIT ADD ON: HCPCS | Performed by: INTERNAL MEDICINE

## 2024-09-09 PROCEDURE — 1159F MED LIST DOCD IN RCRD: CPT | Performed by: INTERNAL MEDICINE

## 2024-09-09 PROCEDURE — 3008F BODY MASS INDEX DOCD: CPT | Performed by: INTERNAL MEDICINE

## 2024-09-09 PROCEDURE — G0008 ADMIN INFLUENZA VIRUS VAC: HCPCS | Performed by: INTERNAL MEDICINE

## 2024-09-09 RX ORDER — PAROXETINE 10 MG/1
10 TABLET, FILM COATED ORAL EVERY MORNING
Qty: 90 TABLET | Refills: 3 | Status: SHIPPED | OUTPATIENT
Start: 2024-09-09

## 2024-09-09 RX ORDER — PAROXETINE 10 MG/1
10 TABLET, FILM COATED ORAL EVERY MORNING
Qty: 30 TABLET | Refills: 1 | Status: SHIPPED | OUTPATIENT
Start: 2024-09-09 | End: 2024-09-09 | Stop reason: WASHOUT

## 2024-09-09 RX ORDER — ALPRAZOLAM 0.5 MG/1
0.5 TABLET ORAL DAILY PRN
Qty: 30 TABLET | Refills: 2 | Status: SHIPPED | OUTPATIENT
Start: 2024-09-09

## 2024-09-09 NOTE — PROGRESS NOTES
Subjective   Patient ID: Jessika Gama is a 68 y.o. female who presents for Follow-up (On blood work and blood pressure ).    Assessment/Plan     Problem List Items Addressed This Visit       Anxiety    Relevant Medications    ALPRAZolam (Xanax) 0.5 mg tablet    Other Relevant Orders    Drug Screen, Urine With Reflex to Confirmation     Other Visit Diagnoses       High serum vitamin B12    -  Primary    Relevant Orders    Vitamin B12    Need for influenza vaccination        Relevant Orders    Flu vaccine, trivalent, preservative free, HIGH-DOSE, age 65y+ (Fluzone) (Completed)    Medication management        Relevant Orders    Drug Screen, Urine With Reflex to Confirmation          Patient was evaluated today, problem list was reviewed, problems and concerns addressed, Rx list reviewed and updated, lab and tests were noted and reviewed. Life style changes were discussed, always it works better if we eat plant based diet and plenty of fibres and roughage. Consume adequate amount of water and avoid alcohol, light to moderate physical activities and stress reduction are always beneficial for ongoing physical well being. Do not forget to have 6 to 7 hours of sleep regularly and avoid late night erlin screen exposure.    HPI 68-year-old patient have anxiety depression autoimmune arthritis high B12 level and borderline stress-induced hypertension    Negative for headache chest pain hematuria rectal bleed    Negative for suicide homicide ideation    Review laboratory medication discussed with the patient    At age of 68 female patient seen by dentist ophthalmology GI rheumatologist    For anxiety depression continue Xanax and Paxil    For autoimmune arthritis rheumatology follow-up    For borderline hypertension recheck blood pressure was normal    For high B12 level check the B12 and LDH alkaline phosphatase today advise no B12 supplement  Past Medical History:   Diagnosis Date    Abnormal findings on diagnostic imaging of  other specified body structures 06/21/2021    Abnormal CT scan, chest    Acute maxillary sinusitis, unspecified 05/28/2021    Acute maxillary sinusitis    Allergic rhinitis, unspecified 06/21/2021    Chronic allergic rhinitis    Cleft lip, bilateral ([facility]) 01/21/2018    Bilateral cleft lip, complete    Encounter for follow-up examination after completed treatment for conditions other than malignant neoplasm 01/11/2017    Follow-up for plastic surgery    Encounter for immunization 10/07/2021    Encounter for immunization    Encounter for screening for malignant neoplasm of vagina     Vaginal Pap smear    Encounter for screening mammogram for malignant neoplasm of breast 07/06/2021    Other screening mammogram    Generalized anxiety disorder 10/08/2021    JOVANA (generalized anxiety disorder)    Headache, unspecified 06/08/2021    Severe frontal headaches    Insomnia, unspecified 09/12/2019    Insomnia, persistent    Migraine, unspecified, not intractable, without status migrainosus 06/21/2021    Migraine headache    Otalgia, right ear 04/15/2022    Right ear pain    Other bursitis of hip, left hip 01/21/2018    Bursitis of left hip    Other malaise 06/08/2021    Malaise and fatigue    Other skin changes 12/14/2016    Facial aging    Pain in right shoulder 06/08/2021    Right shoulder pain    Pain in unspecified hand 01/21/2018    Pain in hand and fingers    Personal history of diseases of the skin and subcutaneous tissue 01/04/2019    History of cellulitis    Personal history of other diseases of the musculoskeletal system and connective tissue 06/21/2021    History of fibromyalgia    Personal history of other diseases of the respiratory system 03/30/2017    History of acute sinusitis    Personal history of other diseases of the respiratory system 03/30/2017    History of pharyngitis    Personal history of other diseases of the respiratory system 03/22/2017    History of sinusitis    Personal history of other drug  therapy 06/25/2021    History of postmenopausal hormone replacement therapy    Personal history of other endocrine, nutritional and metabolic disease 06/21/2021    History of vitamin D deficiency    Personal history of other mental and behavioral disorders 01/04/2019    History of anxiety disorder    Personal history of other mental and behavioral disorders 01/11/2017    History of anxiety disorder    Personal history of other specified conditions 06/21/2021    History of fatigue    Personal history of other specified conditions 10/07/2021    History of edema    Personal history of pneumonia (recurrent) 06/21/2021    History of pneumonia    Recurrent oral aphthae 03/22/2017    Canker sore    Unspecified mycosis 06/25/2021    Fungal infection of lung    Unspecified nonsuppurative otitis media, unspecified ear 03/17/2015    Middle ear effusion    Unspecified osteoarthritis, unspecified site 10/08/2021    Osteoarthritis     Past Surgical History:   Procedure Laterality Date    CATARACT EXTRACTION  05/11/2016    Cataract Surgery    COLONOSCOPY  03/04/2014    Complete Colonoscopy    HIP SURGERY  03/04/2014    Hip Surgery Right    OOPHORECTOMY  05/11/2016    Oophorectomy    OTHER SURGICAL HISTORY  03/04/2014    Hip Surgery Left    OTHER SURGICAL HISTORY  05/11/2016    Palatoplasty For Cleft Palate    OTHER SURGICAL HISTORY  05/11/2016    Repair Of Brow Ptosis Left Upper Eyelid    RHINOPLASTY  05/11/2016    Rhinoplasty     No Known Allergies  Current Outpatient Medications   Medication Sig Dispense Refill    Imodium A-D 2 mg capsule 2 capsules (4 mg).      ALPRAZolam (Xanax) 0.5 mg tablet Take 1 tablet (0.5 mg) by mouth once daily as needed for anxiety. 30 tablet 2    citalopram (CeleXA) 20 mg tablet Take 1 tablet (20 mg) by mouth once daily. 30 tablet 0     No current facility-administered medications for this visit.     Family History   Problem Relation Name Age of Onset    Other (smoker) Mother      Emphysema Mother    "   Colon cancer Father       Social History     Socioeconomic History    Marital status:    Tobacco Use    Smoking status: Never    Smokeless tobacco: Never   Vaping Use    Vaping status: Never Used   Substance and Sexual Activity    Alcohol use: Never    Drug use: Never     Immunization History   Administered Date(s) Administered    Flu vaccine (IIV4), preservative free *Check age/dose* 11/07/2018, 12/26/2019    Flu vaccine, trivalent, preservative free, HIGH-DOSE, age 65y+ (Fluzone) 01/18/2018, 11/07/2018, 12/26/2019, 09/09/2024    Pfizer Purple Cap SARS-CoV-2 07/30/2021, 08/20/2021    Pneumococcal conjugate vaccine, 20-valent (PREVNAR 20) 03/12/2024    Tdap vaccine, age 7 year and older (BOOSTRIX, ADACEL) 01/11/2012, 05/12/2024       Review of Systems  Review of systems is otherwise negative unless stated above or in history of present illness.    Objective   Visit Vitals  /77   Pulse 70   Temp 35.7 °C (96.3 °F)   Ht 1.778 m (5' 10\")   Wt 74.1 kg (163 lb 6.4 oz)   SpO2 98%   BMI 23.45 kg/m²   Smoking Status Never   BSA 1.91 m²     Physical Exam  Constitutional: BMI 23     General: not in acute distress.   HENT:      Head: Normocephalic and atraumatic.      Nose: Nose normal.   Eyes: Irritation of eyes     Extraocular Movements: Extraocular movements intact.      Conjunctiva/sclera: Conjunctivae normal.   Cardiovascular:      Rate and Rhythm: Normal rate ,  No M/R/G  Pulmonary:      Effort: Pulmonary effort is normal.      Breath sounds: Normal, Bilat Equal AE  Skin:     General: Skin is warm.   Neurological:      Mental Status: He is alert and oriented to person, place, and time.   Psychiatric:   Anxiety depression without suicide   Mood and Affect: Mood normal.         Behavior: Behavior normal.   Musculoskeletal   FROM in all extremitirs,  Joint-no swelling or tenderness    Orders Only on 08/01/2024   Component Date Value Ref Range Status    [facility] IRON 08/01/2024 76  50 - 170 ug/dl Final    " [facility] Saturation 08/01/2024 20.9  20.0 - 50.0 % Final    [facility] TIBC 08/01/2024 363  250 - 425 ug/ml Final    [facility] Haptoglobin 08/01/2024 115  30 - 200 mg/dL Final    [facility] HCT 08/01/2024 40.4  36.0 - 46.0 % Final    [facility] Immature Retic Fraction 08/01/2024 0.26  0.20 - 0.50 Final    [facility] Retic Count 08/01/2024 0.6  0.5 - 2.5 % Final    [facility] RBC 08/01/2024 4.76  4.20 - 5.40 x10 Final    [facility] Retic Absolute 08/01/2024 30  22 - 110 x10 Final    [facility] Retic Corrected 08/01/2024 NA  % Final    [facility] HGB 08/01/2024 13.1  12.0 - 16.0 g/dL Final    [facility] MPV 08/01/2024 8.9  7.4 - 10.4 fL Final    [facility] RDW 08/01/2024 15.1 (H)  11.5 - 14.5 % Final    [facility] WBC 08/01/2024 6.8  4.5 - 11.0 x10 Final    [facility] MCH 08/01/2024 27.6  27.0 - 34.0 pg Final    [facility] HCT 08/01/2024 40.4  36.0 - 46.0 % Final    [facility] Platelet 08/01/2024 271  150 - 450 x10 Final    [facility] RBC 08/01/2024 4.76  4.20 - 5.40 x10 Final    [facility] MCHC 08/01/2024 32.5  32.0 - 37.0 g/dL Final    [facility] Instr WBC ND 08/01/2024 6.8   Final    [facility] MCV 08/01/2024 84.8  80.0 - 100.0 fL Final    [facility] Sed Rate Westergren 08/01/2024 6  0 - 30 mm/hr Final    [facility] FERRITIN 08/01/2024 39  10 - 291 ng/mL Final    [facility] Vitamin B12 08/01/2024 >35786 (H)  211 - 911 pg/mL Final    [facility] FOLATE 08/01/2024 16.5  5.4 - 17.5 ng/mL Final    [facility] Vit D 25 08/01/2024 29  ng/mL Final       Radiology: Reviewed imaging in powerchart.  No results found.      Charting was completed using voice recognition technology and may include unintended errors.

## 2025-01-31 ENCOUNTER — OFFICE VISIT (OUTPATIENT)
Dept: PRIMARY CARE | Facility: CLINIC | Age: 70
End: 2025-01-31
Payer: MEDICARE

## 2025-01-31 ENCOUNTER — APPOINTMENT (OUTPATIENT)
Dept: PRIMARY CARE | Facility: CLINIC | Age: 70
End: 2025-01-31
Payer: MEDICARE

## 2025-01-31 VITALS
WEIGHT: 187 LBS | TEMPERATURE: 96.4 F | DIASTOLIC BLOOD PRESSURE: 66 MMHG | HEART RATE: 67 BPM | BODY MASS INDEX: 26.77 KG/M2 | HEIGHT: 70 IN | SYSTOLIC BLOOD PRESSURE: 128 MMHG | OXYGEN SATURATION: 97 %

## 2025-01-31 DIAGNOSIS — H65.04 RECURRENT ACUTE SEROUS OTITIS MEDIA OF RIGHT EAR: ICD-10-CM

## 2025-01-31 DIAGNOSIS — Z79.899 MEDICATION MANAGEMENT: ICD-10-CM

## 2025-01-31 DIAGNOSIS — H92.09 EAR ACHE: ICD-10-CM

## 2025-01-31 DIAGNOSIS — E78.2 HYPERLIPEMIA, MIXED: ICD-10-CM

## 2025-01-31 DIAGNOSIS — F32.4 MAJOR DEPRESSIVE DISORDER WITH SINGLE EPISODE, IN PARTIAL REMISSION (CMS-HCC): ICD-10-CM

## 2025-01-31 DIAGNOSIS — M35.1 MIXED CONNECTIVE TISSUE DISEASE (MULTI): ICD-10-CM

## 2025-01-31 DIAGNOSIS — N30.00 ACUTE CYSTITIS WITHOUT HEMATURIA: ICD-10-CM

## 2025-01-31 DIAGNOSIS — Z00.00 MEDICARE ANNUAL WELLNESS VISIT, SUBSEQUENT: Primary | ICD-10-CM

## 2025-01-31 DIAGNOSIS — Z12.31 ENCOUNTER FOR SCREENING MAMMOGRAM FOR MALIGNANT NEOPLASM OF BREAST: ICD-10-CM

## 2025-01-31 DIAGNOSIS — F41.9 ANXIETY: ICD-10-CM

## 2025-01-31 DIAGNOSIS — R73.9 HYPERGLYCEMIA: ICD-10-CM

## 2025-01-31 PROBLEM — H66.90 ACUTE OTITIS MEDIA: Status: ACTIVE | Noted: 2025-01-31

## 2025-01-31 RX ORDER — METHYLPREDNISOLONE 4 MG/1
TABLET ORAL
Qty: 21 TABLET | Refills: 0 | Status: SHIPPED | OUTPATIENT
Start: 2025-01-31 | End: 2025-02-06

## 2025-01-31 RX ORDER — PREDNISONE 5 MG/1
5 TABLET ORAL 2 TIMES DAILY
COMMUNITY

## 2025-01-31 RX ORDER — ALPRAZOLAM 0.5 MG/1
0.5 TABLET ORAL DAILY PRN
Qty: 30 TABLET | Refills: 2 | Status: SHIPPED | OUTPATIENT
Start: 2025-01-31

## 2025-01-31 RX ORDER — MELOXICAM 15 MG/1
1 TABLET ORAL
COMMUNITY
Start: 2025-01-13

## 2025-01-31 RX ORDER — CEPHALEXIN 500 MG/1
500 CAPSULE ORAL 3 TIMES DAILY
Qty: 30 CAPSULE | Refills: 0 | Status: SHIPPED | OUTPATIENT
Start: 2025-01-31 | End: 2025-02-10

## 2025-01-31 RX ORDER — HYDROXYCHLOROQUINE SULFATE 200 MG/1
2 TABLET, FILM COATED ORAL
COMMUNITY
Start: 2024-10-25

## 2025-01-31 ASSESSMENT — ACTIVITIES OF DAILY LIVING (ADL)
GROCERY_SHOPPING: INDEPENDENT
MANAGING_FINANCES: INDEPENDENT
DRESSING: INDEPENDENT
BATHING: INDEPENDENT
DOING_HOUSEWORK: INDEPENDENT
TAKING_MEDICATION: INDEPENDENT

## 2025-01-31 ASSESSMENT — PATIENT HEALTH QUESTIONNAIRE - PHQ9
1. LITTLE INTEREST OR PLEASURE IN DOING THINGS: NOT AT ALL
2. FEELING DOWN, DEPRESSED OR HOPELESS: NOT AT ALL
SUM OF ALL RESPONSES TO PHQ9 QUESTIONS 1 AND 2: 0
1. LITTLE INTEREST OR PLEASURE IN DOING THINGS: NOT AT ALL
SUM OF ALL RESPONSES TO PHQ9 QUESTIONS 1 AND 2: 0
2. FEELING DOWN, DEPRESSED OR HOPELESS: NOT AT ALL

## 2025-01-31 NOTE — PROGRESS NOTES
Assessment and Plan:  Problem List Items Addressed This Visit       Encounter for screening mammogram for malignant neoplasm of breast - Primary     Taking Xanax melatonin advised urine tox screen signing contract consent I personally reviewed the OARRS report for this patient. This report is scanned into the electronic medical record. I have considered  the risks of abuse, dependence, addiction and diversion. After discussion, patient does have a good understanding of the safety and  risks of this medication. I believe that it is clinically appropriate for this patient to be prescribed this medication.  See patient back in 6 weeks.         Relevant Orders    Albumin-Creatinine Ratio, Urine Random    CBC and Auto Differential    Comprehensive Metabolic Panel    Hemoglobin A1C    Lipid Panel    Magnesium    Urine Culture    Uric Acid    TSH with reflex to Free T4 if abnormal    BI mammo bilateral screening tomosynthesis    Drug Screen, Urine With Reflex to Confirmation    Anxiety    Relevant Medications    ALPRAZolam (Xanax) 0.5 mg tablet    Other Relevant Orders    Albumin-Creatinine Ratio, Urine Random    CBC and Auto Differential    Comprehensive Metabolic Panel    Hemoglobin A1C    Lipid Panel    Magnesium    Urine Culture    Uric Acid    TSH with reflex to Free T4 if abnormal    Drug Screen, Urine With Reflex to Confirmation    Mixed connective tissue disease (Multi)     Continue Plaquenil B12 folic acid meloxicam follow-up with rheumatology ophthalmology         Relevant Orders    Albumin-Creatinine Ratio, Urine Random    CBC and Auto Differential    Comprehensive Metabolic Panel    Hemoglobin A1C    Lipid Panel    Magnesium    Urine Culture    Uric Acid    TSH with reflex to Free T4 if abnormal    Drug Screen, Urine With Reflex to Confirmation    Major depressive disorder with single episode, in partial remission (CMS-HCC)     PHQ 3 not suicidal discontinue Paxil because patient does not think she is depressed  anymore         Relevant Orders    Albumin-Creatinine Ratio, Urine Random    CBC and Auto Differential    Comprehensive Metabolic Panel    Hemoglobin A1C    Lipid Panel    Magnesium    Urine Culture    Uric Acid    TSH with reflex to Free T4 if abnormal    Drug Screen, Urine With Reflex to Confirmation    Acute otitis media     Keflex 503 times a day probiotic twice a day Medrol Dosepak otitis media externa with the cephalgia right temporal area problem is continue ENT evaluation or CAT scan of sinus follow-up 2 weeks          Other Visit Diagnoses       Hyperglycemia        Relevant Orders    Hemoglobin A1C    Hyperlipemia, mixed        Relevant Orders    Lipid Panel    Acute cystitis without hematuria        Relevant Orders    Urine Culture    Ear ache        Relevant Medications    cephalexin (Keflex) 500 mg capsule    methylPREDNISolone (Medrol Dospak) 4 mg tablets              Chief Complaint:   Annual Medicare Wellness Office Exam/Comprehensive Problem Focused Follow Up and Physical Exam right-sided ear pain deafness sinus congestion sore throat right-sided temporal occipital headache    HPI: This is a 69-year-old patient came for Medicare wellness evaluation send discussed about autoimmune arthritis affecting the muscles small skin same cephalgia associate with the recurrent ear and TMJ pain this time happened acutely duration 3 weeks progress slowly aggravating factor cold weather autoimmune disease    Negative for visual or speech problem    Negative for head or spine injury    Negative for RSV flu or COVID    Aggravating factor underlying autoimmune disease and cold weather relieving factor none associated problem eustachian tube block    Anxiety Xanax    Mixed connective tissue disease B12 folic acid Plaquenil    Diarrhea Imodium and a probiotic    Osteopenia osteoarthritis vitamin C 40 meloxicam    Polymyalgia rheumatica rheumatology follow-up recs    At age of 69 female skin cancer breast cancer colon  cancer cancer screening flu pneumonia COVID-19 vaccine sent for the DEXA scan mammogram: Checkup follow-up recommend        Patient Active Problem List:  Patient Active Problem List   Diagnosis    Need for influenza vaccination    High serum vitamin B12    Encounter for screening mammogram for malignant neoplasm of breast    Anxiety    Mixed connective tissue disease (Multi)    Major depressive disorder with single episode, in partial remission (CMS-HCC)    Acute otitis media          Comprehensive Medical/Surgical/Social/Family History:  Family History   Problem Relation Name Age of Onset    Other (smoker) Mother      Emphysema Mother      Colon cancer Father         Past Medical History:   Diagnosis Date    Abnormal findings on diagnostic imaging of other specified body structures 06/21/2021    Abnormal CT scan, chest    Acute maxillary sinusitis, unspecified 05/28/2021    Acute maxillary sinusitis    Allergic rhinitis, unspecified 06/21/2021    Chronic allergic rhinitis    Cleft lip, bilateral ([facility]) 01/21/2018    Bilateral cleft lip, complete    Encounter for follow-up examination after completed treatment for conditions other than malignant neoplasm 01/11/2017    Follow-up for plastic surgery    Encounter for immunization 10/07/2021    Encounter for immunization    Encounter for screening for malignant neoplasm of vagina     Vaginal Pap smear    Encounter for screening mammogram for malignant neoplasm of breast 07/06/2021    Other screening mammogram    Generalized anxiety disorder 10/08/2021    JOVANA (generalized anxiety disorder)    Headache, unspecified 06/08/2021    Severe frontal headaches    Insomnia, unspecified 09/12/2019    Insomnia, persistent    Migraine, unspecified, not intractable, without status migrainosus 06/21/2021    Migraine headache    Otalgia, right ear 04/15/2022    Right ear pain    Other bursitis of hip, left hip 01/21/2018    Bursitis of left hip    Other malaise 06/08/2021    Malaise  and fatigue    Other skin changes 12/14/2016    Facial aging    Pain in right shoulder 06/08/2021    Right shoulder pain    Pain in unspecified hand 01/21/2018    Pain in hand and fingers    Personal history of diseases of the skin and subcutaneous tissue 01/04/2019    History of cellulitis    Personal history of other diseases of the musculoskeletal system and connective tissue 06/21/2021    History of fibromyalgia    Personal history of other diseases of the respiratory system 03/30/2017    History of acute sinusitis    Personal history of other diseases of the respiratory system 03/30/2017    History of pharyngitis    Personal history of other diseases of the respiratory system 03/22/2017    History of sinusitis    Personal history of other drug therapy 06/25/2021    History of postmenopausal hormone replacement therapy    Personal history of other endocrine, nutritional and metabolic disease 06/21/2021    History of vitamin D deficiency    Personal history of other mental and behavioral disorders 01/04/2019    History of anxiety disorder    Personal history of other mental and behavioral disorders 01/11/2017    History of anxiety disorder    Personal history of other specified conditions 06/21/2021    History of fatigue    Personal history of other specified conditions 10/07/2021    History of edema    Personal history of pneumonia (recurrent) 06/21/2021    History of pneumonia    Recurrent oral aphthae 03/22/2017    Canker sore    Unspecified mycosis 06/25/2021    Fungal infection of lung    Unspecified nonsuppurative otitis media, unspecified ear 03/17/2015    Middle ear effusion    Unspecified osteoarthritis, unspecified site 10/08/2021    Osteoarthritis       Past Surgical History:   Procedure Laterality Date    CATARACT EXTRACTION  05/11/2016    Cataract Surgery    COLONOSCOPY  03/04/2014    Complete Colonoscopy    HIP SURGERY  03/04/2014    Hip Surgery Right    OOPHORECTOMY  05/11/2016    Oophorectomy     OTHER SURGICAL HISTORY  03/04/2014    Hip Surgery Left    OTHER SURGICAL HISTORY  05/11/2016    Palatoplasty For Cleft Palate    OTHER SURGICAL HISTORY  05/11/2016    Repair Of Brow Ptosis Left Upper Eyelid    RHINOPLASTY  05/11/2016    Rhinoplasty       Social History     Socioeconomic History    Marital status:    Tobacco Use    Smoking status: Never    Smokeless tobacco: Never   Vaping Use    Vaping status: Never Used   Substance and Sexual Activity    Alcohol use: Never    Drug use: Never       Tobacco/Alcohol/Opioid use, as well as Illicit Drug Use was screened for/reviewed and documented in Social Documentation section of the chart and medication list as appropriate    Allergies and Medications  No Known Allergies  Current Outpatient Medications   Medication Sig Dispense Refill    hydroxychloroquine (Plaquenil) 200 mg tablet Take 2 tablets (400 mg) by mouth early in the morning..      Imodium A-D 2 mg capsule 2 capsules (4 mg).      meloxicam (Mobic) 15 mg tablet Take 1 tablet (15 mg) by mouth early in the morning..      predniSONE (Deltasone) 5 mg tablet Take 1 tablet (5 mg) by mouth 2 times a day.      ALPRAZolam (Xanax) 0.5 mg tablet Take 1 tablet (0.5 mg) by mouth once daily as needed for anxiety. 30 tablet 2    cephalexin (Keflex) 500 mg capsule Take 1 capsule (500 mg) by mouth 3 times a day for 10 days. 30 capsule 0    methylPREDNISolone (Medrol Dospak) 4 mg tablets Take as directed on package. 21 tablet 0     No current facility-administered medications for this visit.       Medications and Supplements  prescribed by me and other practitioners or clinical pharmacist (such as prescriptions, OTC's, herbal therapies and supplements) were reviewed and documented in the medical record.     Advance directives  Advanced Care Planning (including a Living Will, Healthcare POA, as well as specific end of life choices and/or directives), was discussed for approximately 16 minutes with the patient and/or  "surrogate, voluntarily, and documented in the Problem List of the medical record.     Cardiac Risk Assessment  Cardiovascular risk was discussed and, if needed, lifestyle modifications recommended, including nutritional choices, exercise, and elimination of habits contributing to risk. We agreed on a plan to reduce the current cardiovascular risk based on above discussion as needed.  Aspirin use/disuse was discussed and documented in the Problem List of the medical record after reviewing the updated guidelines below:    Consider low dose Aspirin ( mg) use if the benefit for cardiovascular disease prevention outweighs risk for bleeding complications.   In general, low dose ASA should be considered:  In patients WITHOUT prior MI/stroke/PAD (primary prevention):   a. Age <60: Use if 10-year cardiovascular disease risk >20%, with discussion of risks and benefits with patient  b. Age 60-<70: Use if 10-year cardiovascular disease risk >20% and low bleeding (e.g., gastrointenstinal) risk, with discussion of risks and benefits with patient  c. Age >=70: Do not use    In patients WITH prior MI/stroke/PAD (secondary prevention):   Generally use unless extremely high bleeding (e.g., gastrointenstinal) risk, with discussion of risks and benefits with patient    ROS otherwise negative aside from what was mentioned above in HPI.    Visit Vitals  /66   Pulse 67   Temp 35.8 °C (96.4 °F)   Ht 1.778 m (5' 10\")   Wt 84.8 kg (187 lb)   SpO2 97%   BMI 26.83 kg/m²   Smoking Status Never   BSA 2.05 m²       Physical Exam.  Physical Exam:    Appearance: Alert, oriented , cooperative,  in no acute distress. Well nourished & well hydrated.    Skin: Intact,  dry skin, no lesions, rash, petechiae or purpura.     Eyes: PERRLA, EOMs intact,  Conjunctiva pink with no redness or exudates. Cornea & anterior chamber are clear, Eyelids without lesions. No scleral icterus.     ENT: Right ear canal tympanic membrane redness swelling " tenderness right TMJ and sinus pressure right-sided hyperesthesia temporal mandibular area right    Neck: Supple, without meningismus. Thyroid not palpable. Trachea at midline. No lymphadenopathy.    Pulmonary: Clear bilaterally with good chest wall excursion. No rales, rhonchi or wheezing. No accessory muscle use or stridor.    Cardiac: Heart murmur.    Abdomen: Hyperactive bowel sounds    Genitourinary: Exam deferred.    Musculoskeletal: Arthritis of both medium and small joints affecting the both arms and legs specially hands and feet.    Neurological: Connective tissue disease with the tingling numbness upper lower extremity    Psychiatric: Moderate anxiety without depression  During the course of the visit the patient was educated and counseled about age appropriate screening and preventive services. Completed preventive screenings were documented in the chart and orders were placed for outstanding screenings/procedures as documented in the Assessment and Plan.    Patient Instructions (the written plan) was given to the patient at check out.    Charting was completed using voice recognition technology and may include unintended errors.

## 2025-01-31 NOTE — ASSESSMENT & PLAN NOTE
Taking Xanax melatonin advised urine tox screen signing contract consent I personally reviewed the OARRS report for this patient. This report is scanned into the electronic medical record. I have considered  the risks of abuse, dependence, addiction and diversion. After discussion, patient does have a good understanding of the safety and  risks of this medication. I believe that it is clinically appropriate for this patient to be prescribed this medication.  See patient back in 6 weeks.

## 2025-01-31 NOTE — ASSESSMENT & PLAN NOTE
Keflex 503 times a day probiotic twice a day Medrol Dosepak otitis media externa with the cephalgia right temporal area problem is continue ENT evaluation or CAT scan of sinus follow-up 2 weeks

## 2025-02-02 LAB
ALBUMIN SERPL-MCNC: 4 G/DL (ref 3.6–5.1)
ALBUMIN/CREAT UR: NORMAL MG/G CREAT
ALP SERPL-CCNC: 52 U/L (ref 37–153)
ALT SERPL-CCNC: 14 U/L (ref 6–29)
AMPHETAMINES UR QL: NEGATIVE NG/ML
ANION GAP SERPL CALCULATED.4IONS-SCNC: 10 MMOL/L (CALC) (ref 7–17)
AST SERPL-CCNC: 18 U/L (ref 10–35)
BACTERIA UR CULT: NORMAL
BARBITURATES UR QL: NEGATIVE NG/ML
BASOPHILS # BLD AUTO: 61 CELLS/UL (ref 0–200)
BASOPHILS NFR BLD AUTO: 1.6 %
BENZODIAZ UR QL: NEGATIVE NG/ML
BILIRUB SERPL-MCNC: 0.4 MG/DL (ref 0.2–1.2)
BUN SERPL-MCNC: 11 MG/DL (ref 7–25)
BZE UR QL: NEGATIVE NG/ML
CALCIUM SERPL-MCNC: 9 MG/DL (ref 8.6–10.4)
CHLORIDE SERPL-SCNC: 102 MMOL/L (ref 98–110)
CHOLEST SERPL-MCNC: 216 MG/DL
CHOLEST/HDLC SERPL: 3.3 (CALC)
CO2 SERPL-SCNC: 27 MMOL/L (ref 20–32)
CREAT SERPL-MCNC: 0.63 MG/DL (ref 0.6–1)
CREAT UR-MCNC: 80.9 MG/DL
CREAT UR-MCNC: 86 MG/DL (ref 20–275)
EGFRCR SERPLBLD CKD-EPI 2021: 95 ML/MIN/1.73M2
EOSINOPHIL # BLD AUTO: 160 CELLS/UL (ref 15–500)
EOSINOPHIL NFR BLD AUTO: 4.2 %
ERYTHROCYTE [DISTWIDTH] IN BLOOD BY AUTOMATED COUNT: 13.2 % (ref 11–15)
EST. AVERAGE GLUCOSE BLD GHB EST-MCNC: 117 MG/DL
EST. AVERAGE GLUCOSE BLD GHB EST-SCNC: 6.5 MMOL/L
GLUCOSE SERPL-MCNC: 78 MG/DL (ref 65–139)
HBA1C MFR BLD: 5.7 % OF TOTAL HGB
HCT VFR BLD AUTO: 40.3 % (ref 35–45)
HDLC SERPL-MCNC: 65 MG/DL
HGB BLD-MCNC: 13 G/DL (ref 11.7–15.5)
LDLC SERPL CALC-MCNC: 127 MG/DL (CALC)
LYMPHOCYTES # BLD AUTO: 1543 CELLS/UL (ref 850–3900)
LYMPHOCYTES NFR BLD AUTO: 40.6 %
MAGNESIUM SERPL-MCNC: 2.2 MG/DL (ref 1.5–2.5)
MCH RBC QN AUTO: 27.8 PG (ref 27–33)
MCHC RBC AUTO-ENTMCNC: 32.3 G/DL (ref 32–36)
MCV RBC AUTO: 86.1 FL (ref 80–100)
METHADONE UR QL: NEGATIVE NG/ML
MICROALBUMIN UR-MCNC: <0.2 MG/DL
MONOCYTES # BLD AUTO: 456 CELLS/UL (ref 200–950)
MONOCYTES NFR BLD AUTO: 12 %
NEUTROPHILS # BLD AUTO: 1581 CELLS/UL (ref 1500–7800)
NEUTROPHILS NFR BLD AUTO: 41.6 %
NONHDLC SERPL-MCNC: 151 MG/DL (CALC)
OPIATES UR QL: NEGATIVE NG/ML
OXIDANTS UR QL: NEGATIVE MCG/ML
OXYCODONE UR QL: NEGATIVE NG/ML
PCP UR QL: NEGATIVE NG/ML
PH UR: 6 [PH] (ref 4.5–9)
PLATELET # BLD AUTO: 247 THOUSAND/UL (ref 140–400)
PMV BLD REES-ECKER: 10.9 FL (ref 7.5–12.5)
POTASSIUM SERPL-SCNC: 4 MMOL/L (ref 3.5–5.3)
PROT SERPL-MCNC: 6 G/DL (ref 6.1–8.1)
QUEST NOTES AND COMMENTS: NORMAL
RBC # BLD AUTO: 4.68 MILLION/UL (ref 3.8–5.1)
SODIUM SERPL-SCNC: 139 MMOL/L (ref 135–146)
THC UR QL: NEGATIVE NG/ML
TRIGL SERPL-MCNC: 127 MG/DL
TSH SERPL-ACNC: 4.12 MIU/L (ref 0.4–4.5)
URATE SERPL-MCNC: 3.4 MG/DL (ref 2.5–7)
WBC # BLD AUTO: 3.8 THOUSAND/UL (ref 3.8–10.8)

## 2025-02-03 ENCOUNTER — TELEPHONE (OUTPATIENT)
Dept: PRIMARY CARE | Facility: CLINIC | Age: 70
End: 2025-02-03

## 2025-02-03 ENCOUNTER — TELEPHONE (OUTPATIENT)
Dept: PRIMARY CARE | Facility: CLINIC | Age: 70
End: 2025-02-03
Payer: MEDICARE

## 2025-02-03 DIAGNOSIS — E78.2 HYPERLIPEMIA, MIXED: ICD-10-CM

## 2025-02-03 NOTE — TELEPHONE ENCOUNTER
SPOKE WITH PATIENT AND READ HER INFORMATION. SHE DID NOT HAVE ANY QUESTIONS. SHE DID STATE THAT SHE HAS NOT BEEN EATING HEALTHY, OK TO SEND CRESTOR

## 2025-02-03 NOTE — TELEPHONE ENCOUNTER
----- Message from Elaine Martinez sent at 2/3/2025 10:20 AM EST -----  Protein 6 A1c 5.7 total cholesterol 216  all other tests are normal advised Crestor 5 mg a day #90 follow-up low-fat low-carb diet

## 2025-02-04 RX ORDER — ROSUVASTATIN CALCIUM 5 MG/1
5 TABLET, COATED ORAL DAILY
Qty: 90 TABLET | Refills: 3 | Status: SHIPPED | OUTPATIENT
Start: 2025-02-04

## 2025-02-11 ENCOUNTER — TELEMEDICINE (OUTPATIENT)
Dept: PRIMARY CARE | Facility: CLINIC | Age: 70
End: 2025-02-11
Payer: MEDICARE

## 2025-02-11 VITALS — TEMPERATURE: 97.6 F | WEIGHT: 187 LBS | HEIGHT: 70 IN | BODY MASS INDEX: 26.77 KG/M2

## 2025-02-11 DIAGNOSIS — M35.1 MIXED CONNECTIVE TISSUE DISEASE (MULTI): ICD-10-CM

## 2025-02-11 DIAGNOSIS — F41.9 ANXIETY AND DEPRESSION: ICD-10-CM

## 2025-02-11 DIAGNOSIS — H66.90 ACUTE OTITIS MEDIA, UNSPECIFIED OTITIS MEDIA TYPE: Primary | ICD-10-CM

## 2025-02-11 DIAGNOSIS — E78.2 HYPERLIPEMIA, MIXED: ICD-10-CM

## 2025-02-11 DIAGNOSIS — F32.A ANXIETY AND DEPRESSION: ICD-10-CM

## 2025-02-11 PROBLEM — R79.89 HIGH SERUM VITAMIN B12: Status: RESOLVED | Noted: 2024-03-12 | Resolved: 2025-02-11

## 2025-02-11 PROBLEM — Z23 NEED FOR INFLUENZA VACCINATION: Status: RESOLVED | Noted: 2024-03-12 | Resolved: 2025-02-11

## 2025-02-11 PROBLEM — Z12.31 ENCOUNTER FOR SCREENING MAMMOGRAM FOR MALIGNANT NEOPLASM OF BREAST: Status: RESOLVED | Noted: 2024-03-12 | Resolved: 2025-02-11

## 2025-02-11 PROBLEM — F32.4 MAJOR DEPRESSIVE DISORDER WITH SINGLE EPISODE, IN PARTIAL REMISSION (CMS-HCC): Status: RESOLVED | Noted: 2024-09-09 | Resolved: 2025-02-11

## 2025-02-11 PROCEDURE — G2211 COMPLEX E/M VISIT ADD ON: HCPCS | Performed by: INTERNAL MEDICINE

## 2025-02-11 PROCEDURE — 1036F TOBACCO NON-USER: CPT | Performed by: INTERNAL MEDICINE

## 2025-02-11 PROCEDURE — 99213 OFFICE O/P EST LOW 20 MIN: CPT | Performed by: INTERNAL MEDICINE

## 2025-02-11 PROCEDURE — 1159F MED LIST DOCD IN RCRD: CPT | Performed by: INTERNAL MEDICINE

## 2025-02-11 PROCEDURE — 1123F ACP DISCUSS/DSCN MKR DOCD: CPT | Performed by: INTERNAL MEDICINE

## 2025-02-11 PROCEDURE — 3008F BODY MASS INDEX DOCD: CPT | Performed by: INTERNAL MEDICINE

## 2025-02-11 PROCEDURE — 1157F ADVNC CARE PLAN IN RCRD: CPT | Performed by: INTERNAL MEDICINE

## 2025-02-11 PROCEDURE — 1160F RVW MEDS BY RX/DR IN RCRD: CPT | Performed by: INTERNAL MEDICINE

## 2025-02-11 RX ORDER — LEVOFLOXACIN 500 MG/1
500 TABLET, FILM COATED ORAL DAILY
Qty: 8 TABLET | Refills: 0 | Status: SHIPPED | OUTPATIENT
Start: 2025-02-11 | End: 2025-02-19

## 2025-02-11 RX ORDER — PREDNISONE 10 MG/1
10 TABLET ORAL DAILY
Qty: 8 TABLET | Refills: 0 | Status: SHIPPED | OUTPATIENT
Start: 2025-02-11

## 2025-02-11 ASSESSMENT — PATIENT HEALTH QUESTIONNAIRE - PHQ9
SUM OF ALL RESPONSES TO PHQ9 QUESTIONS 1 AND 2: 0
1. LITTLE INTEREST OR PLEASURE IN DOING THINGS: NOT AT ALL
2. FEELING DOWN, DEPRESSED OR HOPELESS: NOT AT ALL

## 2025-02-11 NOTE — PROGRESS NOTES
Subjective   Patient ID: eJssika Gama is a 69 y.o. female who presents for Virtual Visit (Congestion-- 3 days now /), Sore Throat, and Earache.    Assessment/Plan     Problem List Items Addressed This Visit       Hyperlipemia, mixed    Anxiety and depression    Mixed connective tissue disease (Multi)     Continue Plaquenil folic acid ophthalmology rheumatology to follow         Acute otitis media - Primary     Recurrent ear infection sinus infection immunocompromise host.  Given Levaquin 500 1 a day prednisone 10 1 a day watch for QTc interval tendinitis steroid psychosis if no better ENT evaluation Dr. Odilon Hi          Protein 6 A1c 5.7 total cholesterol 216  all other tests are normal advised Crestor 5 mg a day #90 follow-up low-fat low-carb diet   HPI Jessika Gama is a 69 y.o. female who presents for Virtual Visit (Congestion-- 3 days now /), Sore Throat, and Earache 69-year-old patient have anxiety depressions mixed congestive disease hyperlipidemia complaining of sore throat headache ear pain onset acutely duration 3 days progressive acutely aggravating factor cold weather immunocompromise host    Recently treated with the Keflex Medrol Dosepak does not get any better    Recurrent problem possible autoimmune disease immunocompromise host we will try the Levaquin prednisone and Mucinex if does not get any better advised to Dr. Odilon Hi ear nose and throat for possible further evaluations and management.    Negative for Apetex hemoptysis hematuria rectal bleeding    Negative for follow suicide    Negative for RSV flu or COVID-19 no loss of taste or smell.  Past Medical History:   Diagnosis Date    Abnormal findings on diagnostic imaging of other specified body structures 06/21/2021    Abnormal CT scan, chest    Acute maxillary sinusitis, unspecified 05/28/2021    Acute maxillary sinusitis    Allergic rhinitis, unspecified 06/21/2021    Chronic allergic rhinitis    Cleft lip,  bilateral ([facility]) 01/21/2018    Bilateral cleft lip, complete    Encounter for follow-up examination after completed treatment for conditions other than malignant neoplasm 01/11/2017    Follow-up for plastic surgery    Encounter for immunization 10/07/2021    Encounter for immunization    Encounter for screening for malignant neoplasm of vagina     Vaginal Pap smear    Encounter for screening mammogram for malignant neoplasm of breast 07/06/2021    Other screening mammogram    Generalized anxiety disorder 10/08/2021    JOVANA (generalized anxiety disorder)    Headache, unspecified 06/08/2021    Severe frontal headaches    Insomnia, unspecified 09/12/2019    Insomnia, persistent    Migraine, unspecified, not intractable, without status migrainosus 06/21/2021    Migraine headache    Otalgia, right ear 04/15/2022    Right ear pain    Other bursitis of hip, left hip 01/21/2018    Bursitis of left hip    Other malaise 06/08/2021    Malaise and fatigue    Other skin changes 12/14/2016    Facial aging    Pain in right shoulder 06/08/2021    Right shoulder pain    Pain in unspecified hand 01/21/2018    Pain in hand and fingers    Personal history of diseases of the skin and subcutaneous tissue 01/04/2019    History of cellulitis    Personal history of other diseases of the musculoskeletal system and connective tissue 06/21/2021    History of fibromyalgia    Personal history of other diseases of the respiratory system 03/30/2017    History of acute sinusitis    Personal history of other diseases of the respiratory system 03/30/2017    History of pharyngitis    Personal history of other diseases of the respiratory system 03/22/2017    History of sinusitis    Personal history of other drug therapy 06/25/2021    History of postmenopausal hormone replacement therapy    Personal history of other endocrine, nutritional and metabolic disease 06/21/2021    History of vitamin D deficiency    Personal history of other mental and  behavioral disorders 01/04/2019    History of anxiety disorder    Personal history of other mental and behavioral disorders 01/11/2017    History of anxiety disorder    Personal history of other specified conditions 06/21/2021    History of fatigue    Personal history of other specified conditions 10/07/2021    History of edema    Personal history of pneumonia (recurrent) 06/21/2021    History of pneumonia    Recurrent oral aphthae 03/22/2017    Canker sore    Unspecified mycosis 06/25/2021    Fungal infection of lung    Unspecified nonsuppurative otitis media, unspecified ear 03/17/2015    Middle ear effusion    Unspecified osteoarthritis, unspecified site 10/08/2021    Osteoarthritis     Past Surgical History:   Procedure Laterality Date    CATARACT EXTRACTION  05/11/2016    Cataract Surgery    COLONOSCOPY  03/04/2014    Complete Colonoscopy    HIP SURGERY  03/04/2014    Hip Surgery Right    OOPHORECTOMY  05/11/2016    Oophorectomy    OTHER SURGICAL HISTORY  03/04/2014    Hip Surgery Left    OTHER SURGICAL HISTORY  05/11/2016    Palatoplasty For Cleft Palate    OTHER SURGICAL HISTORY  05/11/2016    Repair Of Brow Ptosis Left Upper Eyelid    RHINOPLASTY  05/11/2016    Rhinoplasty     No Known Allergies  Current Outpatient Medications   Medication Sig Dispense Refill    ALPRAZolam (Xanax) 0.5 mg tablet Take 1 tablet (0.5 mg) by mouth once daily as needed for anxiety. 30 tablet 2    hydroxychloroquine (Plaquenil) 200 mg tablet Take 2 tablets (400 mg) by mouth early in the morning..      Imodium A-D 2 mg capsule 2 capsules (4 mg).      meloxicam (Mobic) 15 mg tablet Take 1 tablet (15 mg) by mouth early in the morning..      predniSONE (Deltasone) 5 mg tablet Take 1 tablet (5 mg) by mouth 2 times a day.      rosuvastatin (Crestor) 5 mg tablet Take 1 tablet (5 mg) by mouth once daily. 90 tablet 3     No current facility-administered medications for this visit.     Family History   Problem Relation Name Age of Onset     "Other (smoker) Mother      Emphysema Mother      Colon cancer Father       Social History     Socioeconomic History    Marital status:    Tobacco Use    Smoking status: Never    Smokeless tobacco: Never   Vaping Use    Vaping status: Never Used   Substance and Sexual Activity    Alcohol use: Never    Drug use: Never     Immunization History   Administered Date(s) Administered    Flu vaccine (IIV4), preservative free *Check age/dose* 11/07/2018, 12/26/2019    Flu vaccine, trivalent, preservative free, HIGH-DOSE, age 65y+ (Fluzone) 01/18/2018, 11/07/2018, 12/26/2019, 09/09/2024    Pfizer Purple Cap SARS-CoV-2 07/30/2021, 08/20/2021    Pneumococcal conjugate vaccine, 20-valent (PREVNAR 20) 03/12/2024    Tdap vaccine, age 7 year and older (BOOSTRIX, ADACEL) 01/11/2012, 05/12/2024       Review of Systems  Review of systems is otherwise negative unless stated above or in history of present illness.    Objective   Visit Vitals  Temp 36.4 °C (97.6 °F)   Ht 1.778 m (5' 10\")   Wt 84.8 kg (187 lb)   BMI 26.83 kg/m²   Smoking Status Never   BSA 2.05 m²     Physical Exam  .Doxy  This visit was completed via video audio relation to  covid 19 pandemic all issues as below that discuss and address but no physical exam was performed if it was felt that patient should be evaluated in clinic and they have been advised to follow . Patient verbally consented to visit and spent  more than 50% discuss about patient's complaint of problem and plan        Office Visit on 01/31/2025   Component Date Value Ref Range Status    CREATININE, RANDOM URINE 01/31/2025 86  20 - 275 mg/dL Final    ALBUMIN, URINE 01/31/2025 <0.2  See Note: mg/dL Final    ALBUMIN/CREATININE RATIO, RANDOM U* 01/31/2025 NOTE  <30 mg/g creat Final    WHITE BLOOD CELL COUNT 01/31/2025 3.8  3.8 - 10.8 Thousand/uL Final    RED BLOOD CELL COUNT 01/31/2025 4.68  3.80 - 5.10 Million/uL Final    HEMOGLOBIN 01/31/2025 13.0  11.7 - 15.5 g/dL Final    HEMATOCRIT 01/31/2025 " 40.3  35.0 - 45.0 % Final    MCV 01/31/2025 86.1  80.0 - 100.0 fL Final    MCH 01/31/2025 27.8  27.0 - 33.0 pg Final    MCHC 01/31/2025 32.3  32.0 - 36.0 g/dL Final    RDW 01/31/2025 13.2  11.0 - 15.0 % Final    PLATELET COUNT 01/31/2025 247  140 - 400 Thousand/uL Final    MPV 01/31/2025 10.9  7.5 - 12.5 fL Final    ABSOLUTE NEUTROPHILS 01/31/2025 1,581  1,500 - 7,800 cells/uL Final    ABSOLUTE LYMPHOCYTES 01/31/2025 1,543  850 - 3,900 cells/uL Final    ABSOLUTE MONOCYTES 01/31/2025 456  200 - 950 cells/uL Final    ABSOLUTE EOSINOPHILS 01/31/2025 160  15 - 500 cells/uL Final    ABSOLUTE BASOPHILS 01/31/2025 61  0 - 200 cells/uL Final    NEUTROPHILS 01/31/2025 41.6  % Final    LYMPHOCYTES 01/31/2025 40.6  % Final    MONOCYTES 01/31/2025 12.0  % Final    EOSINOPHILS 01/31/2025 4.2  % Final    BASOPHILS 01/31/2025 1.6  % Final    GLUCOSE 01/31/2025 78  65 - 139 mg/dL Final    UREA NITROGEN (BUN) 01/31/2025 11  7 - 25 mg/dL Final    CREATININE 01/31/2025 0.63  0.60 - 1.00 mg/dL Final    EGFR 01/31/2025 95  > OR = 60 mL/min/1.73m2 Final    SODIUM 01/31/2025 139  135 - 146 mmol/L Final    POTASSIUM 01/31/2025 4.0  3.5 - 5.3 mmol/L Final    CHLORIDE 01/31/2025 102  98 - 110 mmol/L Final    CARBON DIOXIDE 01/31/2025 27  20 - 32 mmol/L Final    ELECTROLYTE BALANCE 01/31/2025 10  7 - 17 mmol/L (calc) Final    CALCIUM 01/31/2025 9.0  8.6 - 10.4 mg/dL Final    PROTEIN, TOTAL 01/31/2025 6.0 (L)  6.1 - 8.1 g/dL Final    ALBUMIN 01/31/2025 4.0  3.6 - 5.1 g/dL Final    BILIRUBIN, TOTAL 01/31/2025 0.4  0.2 - 1.2 mg/dL Final    ALKALINE PHOSPHATASE 01/31/2025 52  37 - 153 U/L Final    AST 01/31/2025 18  10 - 35 U/L Final    ALT 01/31/2025 14  6 - 29 U/L Final    HEMOGLOBIN A1c 01/31/2025 5.7 (H)  <5.7 % of total Hgb Final    eAG (mg/dL) 01/31/2025 117  mg/dL Final    eAG (mmol/L) 01/31/2025 6.5  mmol/L Final    CHOLESTEROL, TOTAL 01/31/2025 216 (H)  <200 mg/dL Final    HDL CHOLESTEROL 01/31/2025 65  > OR = 50 mg/dL Final     TRIGLYCERIDES 01/31/2025 127  <150 mg/dL Final    LDL-CHOLESTEROL 01/31/2025 127 (H)  mg/dL (calc) Final    CHOL/HDLC RATIO 01/31/2025 3.3  <5.0 (calc) Final    NON HDL CHOLESTEROL 01/31/2025 151 (H)  <130 mg/dL (calc) Final    MAGNESIUM 01/31/2025 2.2  1.5 - 2.5 mg/dL Final    CULTURE, URINE, ROUTINE 01/31/2025 SEE NOTE   Final    URIC ACID 01/31/2025 3.4  2.5 - 7.0 mg/dL Final    TSH W/REFLEX TO FT4 01/31/2025 4.12  0.40 - 4.50 mIU/L Final    Amphetamines 01/31/2025 NEGATIVE  <500 ng/mL Final    Barbiturates 01/31/2025 NEGATIVE  <300 ng/mL Final    Benzodiazepines 01/31/2025 NEGATIVE  <100 ng/mL Final    Cocaine Metabolite 01/31/2025 NEGATIVE  <150 ng/mL Final    Marijuana Metabolite 01/31/2025 NEGATIVE  <20 ng/mL Final    Methadone Metabolite 01/31/2025 NEGATIVE  <100 ng/mL Final    Opiates 01/31/2025 NEGATIVE  <100 ng/mL Final    Oxycodone 01/31/2025 NEGATIVE  <100 ng/mL Final    Phencyclidine 01/31/2025 NEGATIVE  <25 ng/mL Final    Creatinine 01/31/2025 80.9  > or = 20.0 mg/dL Final    pH 01/31/2025 6.0  4.5 - 9.0 Final    Oxidant 01/31/2025 NEGATIVE  <200 mcg/mL Final    Notes and Comments 01/31/2025    Final       Radiology: Reviewed imaging in powerchart.  No results found.      Charting was completed using voice recognition technology and may include unintended errors.

## 2025-02-11 NOTE — ASSESSMENT & PLAN NOTE
Recurrent ear infection sinus infection immunocompromise host.  Given Levaquin 500 1 a day prednisone 10 1 a day watch for QTc interval tendinitis steroid psychosis if no better ENT evaluation Dr. Odilon Hi

## 2025-03-25 ENCOUNTER — APPOINTMENT (OUTPATIENT)
Dept: PRIMARY CARE | Facility: CLINIC | Age: 70
End: 2025-03-25
Payer: MEDICARE

## 2025-03-25 VITALS
OXYGEN SATURATION: 98 % | DIASTOLIC BLOOD PRESSURE: 80 MMHG | SYSTOLIC BLOOD PRESSURE: 140 MMHG | BODY MASS INDEX: 25.03 KG/M2 | WEIGHT: 174.8 LBS | HEART RATE: 55 BPM | TEMPERATURE: 97 F | HEIGHT: 70 IN

## 2025-03-25 DIAGNOSIS — M35.1 MIXED CONNECTIVE TISSUE DISEASE (MULTI): ICD-10-CM

## 2025-03-25 DIAGNOSIS — G47.00 INSOMNIA, UNSPECIFIED TYPE: ICD-10-CM

## 2025-03-25 DIAGNOSIS — Z13.820 ENCOUNTER FOR OSTEOPOROSIS SCREENING IN ASYMPTOMATIC POSTMENOPAUSAL PATIENT: ICD-10-CM

## 2025-03-25 DIAGNOSIS — I10 BENIGN ESSENTIAL HYPERTENSION: ICD-10-CM

## 2025-03-25 DIAGNOSIS — F41.9 ANXIETY AND DEPRESSION: Primary | ICD-10-CM

## 2025-03-25 DIAGNOSIS — Z78.0 ENCOUNTER FOR OSTEOPOROSIS SCREENING IN ASYMPTOMATIC POSTMENOPAUSAL PATIENT: ICD-10-CM

## 2025-03-25 DIAGNOSIS — Z79.899 MEDICATION MANAGEMENT: ICD-10-CM

## 2025-03-25 DIAGNOSIS — E78.2 HYPERLIPEMIA, MIXED: ICD-10-CM

## 2025-03-25 DIAGNOSIS — F32.A ANXIETY AND DEPRESSION: Primary | ICD-10-CM

## 2025-03-25 DIAGNOSIS — R73.9 HYPERGLYCEMIA: ICD-10-CM

## 2025-03-25 PROBLEM — H66.90 ACUTE OTITIS MEDIA: Status: RESOLVED | Noted: 2025-01-31 | Resolved: 2025-03-25

## 2025-03-25 PROCEDURE — 1036F TOBACCO NON-USER: CPT | Performed by: INTERNAL MEDICINE

## 2025-03-25 PROCEDURE — 3008F BODY MASS INDEX DOCD: CPT | Performed by: INTERNAL MEDICINE

## 2025-03-25 PROCEDURE — 1159F MED LIST DOCD IN RCRD: CPT | Performed by: INTERNAL MEDICINE

## 2025-03-25 PROCEDURE — 1160F RVW MEDS BY RX/DR IN RCRD: CPT | Performed by: INTERNAL MEDICINE

## 2025-03-25 PROCEDURE — G2211 COMPLEX E/M VISIT ADD ON: HCPCS | Performed by: INTERNAL MEDICINE

## 2025-03-25 PROCEDURE — 1157F ADVNC CARE PLAN IN RCRD: CPT | Performed by: INTERNAL MEDICINE

## 2025-03-25 PROCEDURE — 3077F SYST BP >= 140 MM HG: CPT | Performed by: INTERNAL MEDICINE

## 2025-03-25 PROCEDURE — 99214 OFFICE O/P EST MOD 30 MIN: CPT | Performed by: INTERNAL MEDICINE

## 2025-03-25 PROCEDURE — 1123F ACP DISCUSS/DSCN MKR DOCD: CPT | Performed by: INTERNAL MEDICINE

## 2025-03-25 PROCEDURE — 3079F DIAST BP 80-89 MM HG: CPT | Performed by: INTERNAL MEDICINE

## 2025-03-25 RX ORDER — TRANDOLAPRIL 1 MG/1
1 TABLET ORAL DAILY
Qty: 30 TABLET | Refills: 1 | Status: SHIPPED | OUTPATIENT
Start: 2025-03-25

## 2025-03-25 RX ORDER — AMITRIPTYLINE HYDROCHLORIDE 25 MG/1
25 TABLET, FILM COATED ORAL NIGHTLY
Qty: 30 TABLET | Refills: 2 | Status: SHIPPED | OUTPATIENT
Start: 2025-03-25

## 2025-03-25 RX ORDER — PANTOPRAZOLE SODIUM 40 MG/1
1 TABLET, DELAYED RELEASE ORAL
COMMUNITY
Start: 2025-03-14

## 2025-03-25 ASSESSMENT — PATIENT HEALTH QUESTIONNAIRE - PHQ9
1. LITTLE INTEREST OR PLEASURE IN DOING THINGS: NOT AT ALL
SUM OF ALL RESPONSES TO PHQ9 QUESTIONS 1 AND 2: 0
2. FEELING DOWN, DEPRESSED OR HOPELESS: NOT AT ALL

## 2025-03-25 NOTE — PROGRESS NOTES
Subjective   Patient ID: Jessika Gama is a 69 y.o. female who presents for Insomnia (Months now).    Assessment/Plan     Problem List Items Addressed This Visit       Benign essential hypertension     Cut down salt and protein given Mavik 1 mg a day         Relevant Orders    XR DEXA bone density    Comprehensive Metabolic Panel    Hemoglobin A1C    Lipid Panel    TSH with reflex to Free T4 if abnormal    CK    Hyperlipemia, mixed     Low-fat diet Crestor 5 mg a day check CMP CPK lipid once a year         Relevant Orders    XR DEXA bone density    Comprehensive Metabolic Panel    Hemoglobin A1C    Lipid Panel    TSH with reflex to Free T4 if abnormal    CK    Medication management     Discontinue steroid and Xanax because of the insomnia and dependency of medicine         Anxiety and depression - Primary     Given amitriptyline 25 mg a day for 4 weeks and then 50 mg a day then follow-up         Relevant Orders    XR DEXA bone density    Comprehensive Metabolic Panel    Hemoglobin A1C    Lipid Panel    TSH with reflex to Free T4 if abnormal    CK    Mixed connective tissue disease (Multi)     B12 folic acid plus Plaquenil         Relevant Orders    XR DEXA bone density    Comprehensive Metabolic Panel    Hemoglobin A1C    Lipid Panel    TSH with reflex to Free T4 if abnormal    CK    RESOLVED: Hyperglycemia    Relevant Orders    Hemoglobin A1C     Patient was evaluated today, problem list was reviewed, problems and concerns addressed, Rx list reviewed and updated, lab and tests were noted and reviewed. Life style changes were discussed, always it works better if we eat plant based diet and plenty of fibres and roughage. Consume adequate amount of water and avoid alcohol, light to moderate physical activities and stress reduction are always beneficial for ongoing physical well being. Do not forget to have 6 to 7 hours of sleep regularly and avoid late night erlin screen exposure.    HPI 69-year-old patient have mixed  connective tissue disease osteoarthritis autoimmune arthritis gastritis hypertension hyperlipidemia insomnia anxiety    Negative for jaundice hematuria rectal bleeding    Negative for suicide    Negative for chemical abuse or overuse    Because of the dependency nature of the sleeping aid and sleeping pills advise discontinue Xanax and steroid    Will give amitriptyline 25 mg 1 at night for 4 weeks then 50 mg for 4 weeks and follow-up in 8 weeks    Hypertension advised on caffeine low caffeine low-salt diet Mavik 1 mg a day for 60 follow-up for BMP checkup  Past Medical History:   Diagnosis Date    Abnormal findings on diagnostic imaging of other specified body structures 06/21/2021    Abnormal CT scan, chest    Acute maxillary sinusitis, unspecified 05/28/2021    Acute maxillary sinusitis    Allergic rhinitis, unspecified 06/21/2021    Chronic allergic rhinitis    Cleft lip, bilateral ([facility]) 01/21/2018    Bilateral cleft lip, complete    Encounter for follow-up examination after completed treatment for conditions other than malignant neoplasm 01/11/2017    Follow-up for plastic surgery    Encounter for immunization 10/07/2021    Encounter for immunization    Encounter for screening for malignant neoplasm of vagina     Vaginal Pap smear    Encounter for screening mammogram for malignant neoplasm of breast 07/06/2021    Other screening mammogram    Generalized anxiety disorder 10/08/2021    JOVANA (generalized anxiety disorder)    Headache, unspecified 06/08/2021    Severe frontal headaches    Insomnia, unspecified 09/12/2019    Insomnia, persistent    Migraine, unspecified, not intractable, without status migrainosus 06/21/2021    Migraine headache    Otalgia, right ear 04/15/2022    Right ear pain    Other bursitis of hip, left hip 01/21/2018    Bursitis of left hip    Other malaise 06/08/2021    Malaise and fatigue    Other skin changes 12/14/2016    Facial aging    Pain in right shoulder 06/08/2021    Right  shoulder pain    Pain in unspecified hand 01/21/2018    Pain in hand and fingers    Personal history of diseases of the skin and subcutaneous tissue 01/04/2019    History of cellulitis    Personal history of other diseases of the musculoskeletal system and connective tissue 06/21/2021    History of fibromyalgia    Personal history of other diseases of the respiratory system 03/30/2017    History of acute sinusitis    Personal history of other diseases of the respiratory system 03/30/2017    History of pharyngitis    Personal history of other diseases of the respiratory system 03/22/2017    History of sinusitis    Personal history of other drug therapy 06/25/2021    History of postmenopausal hormone replacement therapy    Personal history of other endocrine, nutritional and metabolic disease 06/21/2021    History of vitamin D deficiency    Personal history of other mental and behavioral disorders 01/04/2019    History of anxiety disorder    Personal history of other mental and behavioral disorders 01/11/2017    History of anxiety disorder    Personal history of other specified conditions 06/21/2021    History of fatigue    Personal history of other specified conditions 10/07/2021    History of edema    Personal history of pneumonia (recurrent) 06/21/2021    History of pneumonia    Recurrent oral aphthae 03/22/2017    Canker sore    Unspecified mycosis 06/25/2021    Fungal infection of lung    Unspecified nonsuppurative otitis media, unspecified ear 03/17/2015    Middle ear effusion    Unspecified osteoarthritis, unspecified site 10/08/2021    Osteoarthritis     Past Surgical History:   Procedure Laterality Date    CATARACT EXTRACTION  05/11/2016    Cataract Surgery    COLONOSCOPY  03/04/2014    Complete Colonoscopy    HIP SURGERY  03/04/2014    Hip Surgery Right    OOPHORECTOMY  05/11/2016    Oophorectomy    OTHER SURGICAL HISTORY  03/04/2014    Hip Surgery Left    OTHER SURGICAL HISTORY  05/11/2016    Palatoplasty  "For Cleft Palate    OTHER SURGICAL HISTORY  05/11/2016    Repair Of Brow Ptosis Left Upper Eyelid    RHINOPLASTY  05/11/2016    Rhinoplasty     No Known Allergies  Current Outpatient Medications   Medication Sig Dispense Refill    hydroxychloroquine (Plaquenil) 200 mg tablet Take 2 tablets (400 mg) by mouth early in the morning..      pantoprazole (ProtoNix) 40 mg EC tablet Take 1 tablet (40 mg) by mouth early in the morning..      rosuvastatin (Crestor) 5 mg tablet Take 1 tablet (5 mg) by mouth once daily. 90 tablet 3     No current facility-administered medications for this visit.     Family History   Problem Relation Name Age of Onset    Other (smoker) Mother      Emphysema Mother      Colon cancer Father       Social History     Socioeconomic History    Marital status:    Tobacco Use    Smoking status: Never    Smokeless tobacco: Never   Vaping Use    Vaping status: Never Used   Substance and Sexual Activity    Alcohol use: Never    Drug use: Never     Immunization History   Administered Date(s) Administered    Flu vaccine (IIV4), preservative free *Check age/dose* 11/07/2018, 12/26/2019    Flu vaccine, trivalent, preservative free, HIGH-DOSE, age 65y+ (Fluzone) 01/18/2018, 11/07/2018, 12/26/2019, 09/09/2024    Pfizer Purple Cap SARS-CoV-2 07/30/2021, 08/20/2021    Pneumococcal conjugate vaccine, 20-valent (PREVNAR 20) 03/12/2024    Tdap vaccine, age 7 year and older (BOOSTRIX, ADACEL) 01/11/2012, 05/12/2024       Review of Systems  Review of systems is otherwise negative unless stated above or in history of present illness.    Objective   Visit Vitals  /82 (BP Location: Left arm, Patient Position: Sitting)   Pulse 55   Temp 36.1 °C (97 °F)   Ht 1.778 m (5' 10\")   Wt 79.3 kg (174 lb 12.8 oz)   SpO2 98%   BMI 25.08 kg/m²   Smoking Status Never   BSA 1.98 m²     Physical Exam  Constitutional: BMI 25     General: not in acute distress.   HENT:      Head: Normocephalic and atraumatic.      Nose: Nose " normal.   Eyes:      Extraocular Movements: Extraocular movements intact.      Conjunctiva/sclera: Conjunctivae normal.   Cardiovascular: Heart murmur     Rate and Rhythm: Normal rate ,  No M/R/G  Pulmonary:      Effort: Pulmonary effort is normal.      Breath sounds: Normal, Bilat Equal AE  Skin:     General: Skin is warm.   Neurological:      Mental Status: He is alert and oriented to person, place, and time.   Psychiatric:    Moderate anxiety     Mood and Affect: Mood normal.         Behavior: Behavior normal.   Musculoskeletal   FROM in all extremitirs,  Joint-no swelling or tenderness    Office Visit on 01/31/2025   Component Date Value Ref Range Status    CREATININE, RANDOM URINE 01/31/2025 86  20 - 275 mg/dL Final    ALBUMIN, URINE 01/31/2025 <0.2  See Note: mg/dL Final    ALBUMIN/CREATININE RATIO, RANDOM U* 01/31/2025 NOTE  <30 mg/g creat Final    WHITE BLOOD CELL COUNT 01/31/2025 3.8  3.8 - 10.8 Thousand/uL Final    RED BLOOD CELL COUNT 01/31/2025 4.68  3.80 - 5.10 Million/uL Final    HEMOGLOBIN 01/31/2025 13.0  11.7 - 15.5 g/dL Final    HEMATOCRIT 01/31/2025 40.3  35.0 - 45.0 % Final    MCV 01/31/2025 86.1  80.0 - 100.0 fL Final    MCH 01/31/2025 27.8  27.0 - 33.0 pg Final    MCHC 01/31/2025 32.3  32.0 - 36.0 g/dL Final    RDW 01/31/2025 13.2  11.0 - 15.0 % Final    PLATELET COUNT 01/31/2025 247  140 - 400 Thousand/uL Final    MPV 01/31/2025 10.9  7.5 - 12.5 fL Final    ABSOLUTE NEUTROPHILS 01/31/2025 1,581  1,500 - 7,800 cells/uL Final    ABSOLUTE LYMPHOCYTES 01/31/2025 1,543  850 - 3,900 cells/uL Final    ABSOLUTE MONOCYTES 01/31/2025 456  200 - 950 cells/uL Final    ABSOLUTE EOSINOPHILS 01/31/2025 160  15 - 500 cells/uL Final    ABSOLUTE BASOPHILS 01/31/2025 61  0 - 200 cells/uL Final    NEUTROPHILS 01/31/2025 41.6  % Final    LYMPHOCYTES 01/31/2025 40.6  % Final    MONOCYTES 01/31/2025 12.0  % Final    EOSINOPHILS 01/31/2025 4.2  % Final    BASOPHILS 01/31/2025 1.6  % Final    GLUCOSE 01/31/2025 78  65  - 139 mg/dL Final    UREA NITROGEN (BUN) 01/31/2025 11  7 - 25 mg/dL Final    CREATININE 01/31/2025 0.63  0.60 - 1.00 mg/dL Final    EGFR 01/31/2025 95  > OR = 60 mL/min/1.73m2 Final    SODIUM 01/31/2025 139  135 - 146 mmol/L Final    POTASSIUM 01/31/2025 4.0  3.5 - 5.3 mmol/L Final    CHLORIDE 01/31/2025 102  98 - 110 mmol/L Final    CARBON DIOXIDE 01/31/2025 27  20 - 32 mmol/L Final    ELECTROLYTE BALANCE 01/31/2025 10  7 - 17 mmol/L (calc) Final    CALCIUM 01/31/2025 9.0  8.6 - 10.4 mg/dL Final    PROTEIN, TOTAL 01/31/2025 6.0 (L)  6.1 - 8.1 g/dL Final    ALBUMIN 01/31/2025 4.0  3.6 - 5.1 g/dL Final    BILIRUBIN, TOTAL 01/31/2025 0.4  0.2 - 1.2 mg/dL Final    ALKALINE PHOSPHATASE 01/31/2025 52  37 - 153 U/L Final    AST 01/31/2025 18  10 - 35 U/L Final    ALT 01/31/2025 14  6 - 29 U/L Final    HEMOGLOBIN A1c 01/31/2025 5.7 (H)  <5.7 % of total Hgb Final    eAG (mg/dL) 01/31/2025 117  mg/dL Final    eAG (mmol/L) 01/31/2025 6.5  mmol/L Final    CHOLESTEROL, TOTAL 01/31/2025 216 (H)  <200 mg/dL Final    HDL CHOLESTEROL 01/31/2025 65  > OR = 50 mg/dL Final    TRIGLYCERIDES 01/31/2025 127  <150 mg/dL Final    LDL-CHOLESTEROL 01/31/2025 127 (H)  mg/dL (calc) Final    CHOL/HDLC RATIO 01/31/2025 3.3  <5.0 (calc) Final    NON HDL CHOLESTEROL 01/31/2025 151 (H)  <130 mg/dL (calc) Final    MAGNESIUM 01/31/2025 2.2  1.5 - 2.5 mg/dL Final    CULTURE, URINE, ROUTINE 01/31/2025 SEE NOTE   Final    URIC ACID 01/31/2025 3.4  2.5 - 7.0 mg/dL Final    TSH W/REFLEX TO FT4 01/31/2025 4.12  0.40 - 4.50 mIU/L Final    Amphetamines 01/31/2025 NEGATIVE  <500 ng/mL Final    Barbiturates 01/31/2025 NEGATIVE  <300 ng/mL Final    Benzodiazepines 01/31/2025 NEGATIVE  <100 ng/mL Final    Cocaine Metabolite 01/31/2025 NEGATIVE  <150 ng/mL Final    Marijuana Metabolite 01/31/2025 NEGATIVE  <20 ng/mL Final    Methadone Metabolite 01/31/2025 NEGATIVE  <100 ng/mL Final    Opiates 01/31/2025 NEGATIVE  <100 ng/mL Final    Oxycodone 01/31/2025  NEGATIVE  <100 ng/mL Final    Phencyclidine 01/31/2025 NEGATIVE  <25 ng/mL Final    Creatinine 01/31/2025 80.9  > or = 20.0 mg/dL Final    pH 01/31/2025 6.0  4.5 - 9.0 Final    Oxidant 01/31/2025 NEGATIVE  <200 mcg/mL Final    Notes and Comments 01/31/2025    Final       Radiology: Reviewed imaging in powerchart.  No results found.      Charting was completed using voice recognition technology and may include unintended errors.

## 2025-03-26 LAB
ALBUMIN SERPL-MCNC: 4.5 G/DL (ref 3.6–5.1)
ALP SERPL-CCNC: 54 U/L (ref 37–153)
ALT SERPL-CCNC: 13 U/L (ref 6–29)
ANION GAP SERPL CALCULATED.4IONS-SCNC: 7 MMOL/L (CALC) (ref 7–17)
AST SERPL-CCNC: 18 U/L (ref 10–35)
BILIRUB SERPL-MCNC: 0.7 MG/DL (ref 0.2–1.2)
BUN SERPL-MCNC: 8 MG/DL (ref 7–25)
CALCIUM SERPL-MCNC: 9.1 MG/DL (ref 8.6–10.4)
CHLORIDE SERPL-SCNC: 98 MMOL/L (ref 98–110)
CHOLEST SERPL-MCNC: 167 MG/DL
CHOLEST/HDLC SERPL: 2.4 (CALC)
CK SERPL-CCNC: 147 U/L (ref 18–225)
CO2 SERPL-SCNC: 29 MMOL/L (ref 20–32)
CREAT SERPL-MCNC: 0.61 MG/DL (ref 0.6–1)
EGFRCR SERPLBLD CKD-EPI 2021: 96 ML/MIN/1.73M2
EST. AVERAGE GLUCOSE BLD GHB EST-MCNC: 114 MG/DL
EST. AVERAGE GLUCOSE BLD GHB EST-SCNC: 6.3 MMOL/L
GLUCOSE SERPL-MCNC: 104 MG/DL (ref 65–139)
HBA1C MFR BLD: 5.6 % OF TOTAL HGB
HDLC SERPL-MCNC: 71 MG/DL
LDLC SERPL CALC-MCNC: 78 MG/DL (CALC)
NONHDLC SERPL-MCNC: 96 MG/DL (CALC)
POTASSIUM SERPL-SCNC: 4.2 MMOL/L (ref 3.5–5.3)
PROT SERPL-MCNC: 6.2 G/DL (ref 6.1–8.1)
SODIUM SERPL-SCNC: 134 MMOL/L (ref 135–146)
TRIGL SERPL-MCNC: 93 MG/DL
TSH SERPL-ACNC: 2.09 MIU/L (ref 0.4–4.5)

## 2025-03-27 ENCOUNTER — TELEPHONE (OUTPATIENT)
Dept: PRIMARY CARE | Facility: CLINIC | Age: 70
End: 2025-03-27
Payer: MEDICARE

## 2025-03-27 NOTE — TELEPHONE ENCOUNTER
----- Message from Elaine Martinez sent at 3/26/2025 11:53 AM EDT -----  Sodium 134 cut down water intake follow-up to recheck blood pressure

## 2025-03-31 ENCOUNTER — TELEPHONE (OUTPATIENT)
Dept: PRIMARY CARE | Facility: CLINIC | Age: 70
End: 2025-03-31
Payer: MEDICARE

## 2025-03-31 NOTE — TELEPHONE ENCOUNTER
PT wants a referral for her insomnia?    How long after not taking the pantoprazole (ProtoNix) 40 mg EC tablet for a week that it is out of PT's body?      PT took a pill that was for hair growth.    Hair la bie - capsules and takes 3 a day  It seems her problems started after this.

## 2025-04-14 ENCOUNTER — TELEPHONE (OUTPATIENT)
Dept: PRIMARY CARE | Facility: CLINIC | Age: 70
End: 2025-04-14
Payer: MEDICARE

## 2025-04-14 DIAGNOSIS — H92.09 EAR ACHE: ICD-10-CM

## 2025-04-14 RX ORDER — PREDNISONE 10 MG/1
10 TABLET ORAL 2 TIMES DAILY
Qty: 14 TABLET | Refills: 0 | Status: CANCELLED | OUTPATIENT
Start: 2025-04-14

## 2025-04-14 NOTE — TELEPHONE ENCOUNTER
Patient referred to ENT for ear and throat pain. Can't be seen until June 2nd. She is wondering if there is something that can be prescribed to get her to apt to help with pain / discomfort. Please advise.

## 2025-04-15 ENCOUNTER — TELEPHONE (OUTPATIENT)
Dept: PRIMARY CARE | Facility: CLINIC | Age: 70
End: 2025-04-15
Payer: MEDICARE

## 2025-04-15 DIAGNOSIS — F32.A ANXIETY AND DEPRESSION: ICD-10-CM

## 2025-04-15 DIAGNOSIS — F41.9 ANXIETY AND DEPRESSION: ICD-10-CM

## 2025-04-15 RX ORDER — CLINDAMYCIN HYDROCHLORIDE 300 MG/1
300 CAPSULE ORAL 3 TIMES DAILY
Qty: 30 CAPSULE | Refills: 0 | Status: SHIPPED | OUTPATIENT
Start: 2025-04-15 | End: 2025-04-25

## 2025-04-15 RX ORDER — ALPRAZOLAM 0.5 MG/1
0.5 TABLET ORAL DAILY
Qty: 30 TABLET | Refills: 0 | Status: SHIPPED | OUTPATIENT
Start: 2025-04-15

## 2025-04-15 NOTE — TELEPHONE ENCOUNTER
Cancel out amitriptyline (Elavil) 25 mg tablet she can't take it, makes her feel drugged out.  She only took one pill.      She would like ALPRAZOLAM .5 that was prescribed to her before.

## 2025-06-10 ENCOUNTER — APPOINTMENT (OUTPATIENT)
Dept: NUTRITION | Facility: CLINIC | Age: 70
End: 2025-06-10
Payer: MEDICARE

## 2025-06-25 ENCOUNTER — OFFICE VISIT (OUTPATIENT)
Dept: PRIMARY CARE | Facility: CLINIC | Age: 70
End: 2025-06-25
Payer: MEDICARE

## 2025-06-25 VITALS
DIASTOLIC BLOOD PRESSURE: 85 MMHG | BODY MASS INDEX: 25.05 KG/M2 | WEIGHT: 175 LBS | SYSTOLIC BLOOD PRESSURE: 159 MMHG | HEIGHT: 70 IN

## 2025-06-25 DIAGNOSIS — A49.9 BACTERIAL INFECTION: ICD-10-CM

## 2025-06-25 DIAGNOSIS — F32.A ANXIETY AND DEPRESSION: ICD-10-CM

## 2025-06-25 DIAGNOSIS — R05.9 COUGH, UNSPECIFIED TYPE: Primary | ICD-10-CM

## 2025-06-25 DIAGNOSIS — M35.1 MIXED CONNECTIVE TISSUE DISEASE (MULTI): ICD-10-CM

## 2025-06-25 DIAGNOSIS — F41.9 ANXIETY AND DEPRESSION: ICD-10-CM

## 2025-06-25 PROCEDURE — 3079F DIAST BP 80-89 MM HG: CPT | Performed by: EMERGENCY MEDICINE

## 2025-06-25 PROCEDURE — 1036F TOBACCO NON-USER: CPT | Performed by: EMERGENCY MEDICINE

## 2025-06-25 PROCEDURE — 99214 OFFICE O/P EST MOD 30 MIN: CPT | Performed by: EMERGENCY MEDICINE

## 2025-06-25 PROCEDURE — 3077F SYST BP >= 140 MM HG: CPT | Performed by: EMERGENCY MEDICINE

## 2025-06-25 PROCEDURE — 1159F MED LIST DOCD IN RCRD: CPT | Performed by: EMERGENCY MEDICINE

## 2025-06-25 PROCEDURE — 3008F BODY MASS INDEX DOCD: CPT | Performed by: EMERGENCY MEDICINE

## 2025-06-25 RX ORDER — ALPRAZOLAM 0.5 MG/1
0.5 TABLET ORAL DAILY
Qty: 30 TABLET | Refills: 0 | Status: SHIPPED | OUTPATIENT
Start: 2025-06-25

## 2025-06-25 RX ORDER — FAMOTIDINE 20 MG/1
TABLET, FILM COATED ORAL
COMMUNITY
Start: 2025-02-28

## 2025-06-25 RX ORDER — AZITHROMYCIN 250 MG/1
250 TABLET, FILM COATED ORAL DAILY
Qty: 6 TABLET | Refills: 0 | Status: SHIPPED | OUTPATIENT
Start: 2025-06-25 | End: 2025-06-30

## 2025-06-25 ASSESSMENT — PATIENT HEALTH QUESTIONNAIRE - PHQ9
2. FEELING DOWN, DEPRESSED OR HOPELESS: NOT AT ALL
1. LITTLE INTEREST OR PLEASURE IN DOING THINGS: NOT AT ALL
SUM OF ALL RESPONSES TO PHQ9 QUESTIONS 1 AND 2: 0

## 2025-06-25 NOTE — LETTER
June 25, 2025     Patient: Jessika Gama   YOB: 1955   Date of Visit: 6/25/2025       To Whom It May Concern:    Jessika Gama was seen in my clinic on 6/25/2025 at 1:45 pm. Please excuse Jessika for her absence from work on this day to make the appointment.    If you have any questions or concerns, please don't hesitate to call.         Sincerely,         Jayjay Gao MD        CC: No Recipients

## 2025-06-25 NOTE — PROGRESS NOTES
"Subjective   Patient ID: Jessika Gama is a 69 y.o. female who presents for Eye Burn and Fatigue.    Assessment/Plan   Problem List Items Addressed This Visit    None  Sinusitis-Z-Sundar    Lesion on the right arm-patient will put over-the-counter antibiotic cream    I refilled her Xanax just for 1 month.  She will follow-up with PCP if she needs more refills    Arthritis-on Plaquenil    Hyperlipidemia-on Crestor    Follow-up as needed    Source of history: Nurse, Medical personnel, Medical record, Patient.  History limitation: None.    HPI  69-year-old lady    Thinks that she is catching something likely sinusitis    Wants refill on her Xanax    Has a lesion on her right forearm which she wants addressed  Allergies[1]    Current Medications[2]    Objective   Visit Vitals  /85   Ht 1.778 m (5' 10\")   Wt 79.4 kg (175 lb)   BMI 25.11 kg/m²   Smoking Status Never   BSA 1.98 m²     Physical Exam  Vital signs as per nursing/MA documentation  General appearance: Alert and in no acute distress  HEENT: Normal Inspection  Neck - Normal Inspection  Respiratory : No respiratory distress. Lungs are clear   Cardiovascular: heart rate normal. No gallop  Back - normal inspection  Skin inspection:Warm  Musculoskeletal : No deformities  Neuro : Limited exam. Baseline    Review of Systems   Comprehensive review of systems as allowed by patient condition and nursing input is negative    Results including lab work, imaging reports were reviewed and wherever possible, independently verified    Family History[3]  Social History[4]  Medical History[5]  Surgical History[6]    Charting was completed using voice recognition technology and may include unintended errors.         [1] No Known Allergies  [2]   Current Outpatient Medications   Medication Sig Dispense Refill    ALPRAZolam (Xanax) 0.5 mg tablet Take 1 tablet (0.5 mg) by mouth once daily. 30 tablet 0    famotidine (Pepcid) 20 mg tablet take one tablet by mouth every day at " bedtime for 90 days      hydroxychloroquine (Plaquenil) 200 mg tablet Take 2 tablets (400 mg) by mouth early in the morning..      rosuvastatin (Crestor) 5 mg tablet Take 1 tablet (5 mg) by mouth once daily. 90 tablet 3    amitriptyline (Elavil) 25 mg tablet Take 1 tablet (25 mg) by mouth once daily at bedtime. (Patient not taking: Reported on 6/25/2025) 30 tablet 2    pantoprazole (ProtoNix) 40 mg EC tablet Take 1 tablet (40 mg) by mouth early in the morning.. (Patient not taking: Reported on 6/25/2025)      trandolapril (Mavik) 1 mg tablet Take 1 tablet (1 mg) by mouth once daily. (Patient not taking: Reported on 6/25/2025) 30 tablet 1     No current facility-administered medications for this visit.   [3]   Family History  Problem Relation Name Age of Onset    Other (smoker) Mother      Emphysema Mother      Colon cancer Father     [4]   Social History  Socioeconomic History    Marital status:    Tobacco Use    Smoking status: Never    Smokeless tobacco: Never   Vaping Use    Vaping status: Never Used   Substance and Sexual Activity    Alcohol use: Never    Drug use: Never   [5]   Past Medical History:  Diagnosis Date    Abnormal findings on diagnostic imaging of other specified body structures 06/21/2021    Abnormal CT scan, chest    Acute maxillary sinusitis, unspecified 05/28/2021    Acute maxillary sinusitis    Allergic rhinitis, unspecified 06/21/2021    Chronic allergic rhinitis    Cleft lip, bilateral ([facility]) 01/21/2018    Bilateral cleft lip, complete    Encounter for follow-up examination after completed treatment for conditions other than malignant neoplasm 01/11/2017    Follow-up for plastic surgery    Encounter for immunization 10/07/2021    Encounter for immunization    Encounter for screening for malignant neoplasm of vagina     Vaginal Pap smear    Encounter for screening mammogram for malignant neoplasm of breast 07/06/2021    Other screening mammogram    Generalized anxiety disorder  10/08/2021    JOVANA (generalized anxiety disorder)    Headache, unspecified 06/08/2021    Severe frontal headaches    Insomnia, unspecified 09/12/2019    Insomnia, persistent    Migraine, unspecified, not intractable, without status migrainosus 06/21/2021    Migraine headache    Otalgia, right ear 04/15/2022    Right ear pain    Other bursitis of hip, left hip 01/21/2018    Bursitis of left hip    Other malaise 06/08/2021    Malaise and fatigue    Other skin changes 12/14/2016    Facial aging    Pain in right shoulder 06/08/2021    Right shoulder pain    Pain in unspecified hand 01/21/2018    Pain in hand and fingers    Personal history of diseases of the skin and subcutaneous tissue 01/04/2019    History of cellulitis    Personal history of other diseases of the musculoskeletal system and connective tissue 06/21/2021    History of fibromyalgia    Personal history of other diseases of the respiratory system 03/30/2017    History of acute sinusitis    Personal history of other diseases of the respiratory system 03/30/2017    History of pharyngitis    Personal history of other diseases of the respiratory system 03/22/2017    History of sinusitis    Personal history of other drug therapy 06/25/2021    History of postmenopausal hormone replacement therapy    Personal history of other endocrine, nutritional and metabolic disease 06/21/2021    History of vitamin D deficiency    Personal history of other mental and behavioral disorders 01/04/2019    History of anxiety disorder    Personal history of other mental and behavioral disorders 01/11/2017    History of anxiety disorder    Personal history of other specified conditions 06/21/2021    History of fatigue    Personal history of other specified conditions 10/07/2021    History of edema    Personal history of pneumonia (recurrent) 06/21/2021    History of pneumonia    Recurrent oral aphthae 03/22/2017    Canker sore    Unspecified mycosis 06/25/2021    Fungal infection of  lung    Unspecified nonsuppurative otitis media, unspecified ear 03/17/2015    Middle ear effusion    Unspecified osteoarthritis, unspecified site 10/08/2021    Osteoarthritis   [6]   Past Surgical History:  Procedure Laterality Date    CATARACT EXTRACTION  05/11/2016    Cataract Surgery    COLONOSCOPY  03/04/2014    Complete Colonoscopy    HIP SURGERY  03/04/2014    Hip Surgery Right    OOPHORECTOMY  05/11/2016    Oophorectomy    OTHER SURGICAL HISTORY  03/04/2014    Hip Surgery Left    OTHER SURGICAL HISTORY  05/11/2016    Palatoplasty For Cleft Palate    OTHER SURGICAL HISTORY  05/11/2016    Repair Of Brow Ptosis Left Upper Eyelid    RHINOPLASTY  05/11/2016    Rhinoplasty

## 2025-07-11 ENCOUNTER — TELEPHONE (OUTPATIENT)
Dept: PRIMARY CARE | Facility: CLINIC | Age: 70
End: 2025-07-11
Payer: MEDICARE

## 2025-08-14 DIAGNOSIS — I10 BENIGN ESSENTIAL HYPERTENSION: ICD-10-CM

## 2025-08-14 RX ORDER — TRANDOLAPRIL 1 MG/1
1 TABLET ORAL DAILY
Qty: 90 TABLET | Refills: 3 | Status: SHIPPED | OUTPATIENT
Start: 2025-08-14

## 2025-08-19 ENCOUNTER — TELEPHONE (OUTPATIENT)
Dept: PRIMARY CARE | Facility: CLINIC | Age: 70
End: 2025-08-19
Payer: MEDICARE

## 2025-08-19 DIAGNOSIS — F41.9 ANXIETY AND DEPRESSION: ICD-10-CM

## 2025-08-19 DIAGNOSIS — F32.A ANXIETY AND DEPRESSION: ICD-10-CM

## 2025-08-19 RX ORDER — ALPRAZOLAM 0.5 MG/1
0.5 TABLET ORAL DAILY
Qty: 10 TABLET | Refills: 0 | Status: SHIPPED | OUTPATIENT
Start: 2025-08-19

## 2025-08-25 ENCOUNTER — APPOINTMENT (OUTPATIENT)
Dept: PRIMARY CARE | Facility: CLINIC | Age: 70
End: 2025-08-25
Payer: MEDICARE

## 2025-08-25 VITALS
OXYGEN SATURATION: 98 % | WEIGHT: 173 LBS | BODY MASS INDEX: 24.77 KG/M2 | HEART RATE: 63 BPM | DIASTOLIC BLOOD PRESSURE: 70 MMHG | TEMPERATURE: 97.3 F | SYSTOLIC BLOOD PRESSURE: 134 MMHG | HEIGHT: 70 IN

## 2025-08-25 DIAGNOSIS — Z79.899 MEDICATION MANAGEMENT: ICD-10-CM

## 2025-08-25 DIAGNOSIS — Z12.31 SCREENING MAMMOGRAM FOR BREAST CANCER: ICD-10-CM

## 2025-08-25 DIAGNOSIS — Z13.820 ENCOUNTER FOR OSTEOPOROSIS SCREENING IN ASYMPTOMATIC POSTMENOPAUSAL PATIENT: ICD-10-CM

## 2025-08-25 DIAGNOSIS — K21.00 GASTROESOPHAGEAL REFLUX DISEASE WITH ESOPHAGITIS WITHOUT HEMORRHAGE: ICD-10-CM

## 2025-08-25 DIAGNOSIS — F32.A ANXIETY AND DEPRESSION: Primary | ICD-10-CM

## 2025-08-25 DIAGNOSIS — Z78.0 ENCOUNTER FOR OSTEOPOROSIS SCREENING IN ASYMPTOMATIC POSTMENOPAUSAL PATIENT: ICD-10-CM

## 2025-08-25 DIAGNOSIS — I10 BENIGN ESSENTIAL HYPERTENSION: ICD-10-CM

## 2025-08-25 DIAGNOSIS — M35.1 MIXED CONNECTIVE TISSUE DISEASE (MULTI): ICD-10-CM

## 2025-08-25 DIAGNOSIS — J39.2 THROAT IRRITATION: ICD-10-CM

## 2025-08-25 DIAGNOSIS — E78.2 HYPERLIPEMIA, MIXED: ICD-10-CM

## 2025-08-25 DIAGNOSIS — F41.9 ANXIETY AND DEPRESSION: Primary | ICD-10-CM

## 2025-08-25 PROCEDURE — 3078F DIAST BP <80 MM HG: CPT | Performed by: INTERNAL MEDICINE

## 2025-08-25 PROCEDURE — 1160F RVW MEDS BY RX/DR IN RCRD: CPT | Performed by: INTERNAL MEDICINE

## 2025-08-25 PROCEDURE — 3075F SYST BP GE 130 - 139MM HG: CPT | Performed by: INTERNAL MEDICINE

## 2025-08-25 PROCEDURE — 3008F BODY MASS INDEX DOCD: CPT | Performed by: INTERNAL MEDICINE

## 2025-08-25 PROCEDURE — 99214 OFFICE O/P EST MOD 30 MIN: CPT | Performed by: INTERNAL MEDICINE

## 2025-08-25 PROCEDURE — G2211 COMPLEX E/M VISIT ADD ON: HCPCS | Performed by: INTERNAL MEDICINE

## 2025-08-25 PROCEDURE — 1036F TOBACCO NON-USER: CPT | Performed by: INTERNAL MEDICINE

## 2025-08-25 PROCEDURE — 1159F MED LIST DOCD IN RCRD: CPT | Performed by: INTERNAL MEDICINE

## 2025-08-25 RX ORDER — FAMOTIDINE 20 MG/1
20 TABLET, FILM COATED ORAL NIGHTLY
Qty: 90 TABLET | Refills: 3 | Status: SHIPPED | OUTPATIENT
Start: 2025-08-25

## 2025-08-26 ENCOUNTER — TELEPHONE (OUTPATIENT)
Dept: PRIMARY CARE | Facility: CLINIC | Age: 70
End: 2025-08-26
Payer: MEDICARE

## 2025-08-26 DIAGNOSIS — F32.A ANXIETY AND DEPRESSION: ICD-10-CM

## 2025-08-26 DIAGNOSIS — F41.9 ANXIETY AND DEPRESSION: ICD-10-CM

## 2025-08-26 RX ORDER — ALPRAZOLAM 0.5 MG/1
0.5 TABLET ORAL DAILY
Qty: 30 TABLET | Refills: 2 | Status: SHIPPED | OUTPATIENT
Start: 2025-08-26

## 2025-08-28 LAB
ALBUMIN SERPL-MCNC: 4.2 G/DL (ref 3.6–5.1)
ALBUMIN/CREAT UR: NORMAL MG/G CREAT
ALP SERPL-CCNC: 57 U/L (ref 37–153)
ALT SERPL-CCNC: 11 U/L (ref 6–29)
AMPHETAMINES UR QL: NEGATIVE NG/ML
ANION GAP SERPL CALCULATED.4IONS-SCNC: 9 MMOL/L (CALC) (ref 7–17)
AST SERPL-CCNC: 18 U/L (ref 10–35)
BARBITURATES UR QL: NEGATIVE NG/ML
BENZODIAZ UR QL: NEGATIVE NG/ML
BILIRUB SERPL-MCNC: 0.5 MG/DL (ref 0.2–1.2)
BUN SERPL-MCNC: 14 MG/DL (ref 7–25)
BZE UR QL: NEGATIVE NG/ML
CALCIUM SERPL-MCNC: 9.2 MG/DL (ref 8.6–10.4)
CHLORIDE SERPL-SCNC: 103 MMOL/L (ref 98–110)
CO2 SERPL-SCNC: 29 MMOL/L (ref 20–32)
CODEINE UR-MCNC: NEGATIVE NG/ML
CREAT SERPL-MCNC: 0.63 MG/DL (ref 0.6–1)
CREAT UR-MCNC: 36.7 MG/DL
CREAT UR-MCNC: 39 MG/DL (ref 20–275)
DRUG SCREEN COMMENT UR-IMP: ABNORMAL
EGFRCR SERPLBLD CKD-EPI 2021: 95 ML/MIN/1.73M2
FENTANYL UR QL SCN: NEGATIVE NG/ML
GLUCOSE SERPL-MCNC: 99 MG/DL (ref 65–99)
HYDROCODONE UR-MCNC: 164 NG/ML
HYDROMORPHONE UR-MCNC: 80 NG/ML
METHADONE UR QL: NEGATIVE NG/ML
MICROALBUMIN UR-MCNC: <0.2 MG/DL
MORPHINE UR-MCNC: NEGATIVE NG/ML
NORHYDROCODONE UR CFM-MCNC: 333 NG/ML
OPIATES UR QL: POSITIVE NG/ML
OXIDANTS UR QL: NEGATIVE MCG/ML
OXYCODONE UR QL: NEGATIVE NG/ML
PCP UR QL: NEGATIVE NG/ML
PH UR: 7.4 [PH] (ref 4.5–9)
POTASSIUM SERPL-SCNC: 4.7 MMOL/L (ref 3.5–5.3)
PROT SERPL-MCNC: 6.1 G/DL (ref 6.1–8.1)
QUEST NOTES AND COMMENTS: ABNORMAL
SODIUM SERPL-SCNC: 141 MMOL/L (ref 135–146)
THC UR QL: NEGATIVE NG/ML

## 2025-09-02 ENCOUNTER — TELEPHONE (OUTPATIENT)
Dept: PRIMARY CARE | Facility: CLINIC | Age: 70
End: 2025-09-02
Payer: MEDICARE

## 2025-09-02 DIAGNOSIS — N39.0 URINARY TRACT INFECTION WITHOUT HEMATURIA, SITE UNSPECIFIED: ICD-10-CM

## 2025-09-02 RX ORDER — DOXYCYCLINE HYCLATE 50 MG/1
50 CAPSULE, GELATIN COATED ORAL 2 TIMES DAILY
Qty: 14 CAPSULE | Refills: 0 | Status: SHIPPED | OUTPATIENT
Start: 2025-09-02

## 2025-10-31 ENCOUNTER — APPOINTMENT (OUTPATIENT)
Dept: GASTROENTEROLOGY | Facility: CLINIC | Age: 70
End: 2025-10-31
Payer: MEDICARE